# Patient Record
Sex: FEMALE | Race: WHITE | NOT HISPANIC OR LATINO | URBAN - METROPOLITAN AREA
[De-identification: names, ages, dates, MRNs, and addresses within clinical notes are randomized per-mention and may not be internally consistent; named-entity substitution may affect disease eponyms.]

---

## 2019-08-20 VITALS
RESPIRATION RATE: 18 BRPM | OXYGEN SATURATION: 98 % | HEART RATE: 90 BPM | SYSTOLIC BLOOD PRESSURE: 123 MMHG | TEMPERATURE: 98 F | WEIGHT: 193.35 LBS | DIASTOLIC BLOOD PRESSURE: 85 MMHG | HEIGHT: 67 IN

## 2019-08-20 NOTE — ASU PATIENT PROFILE, ADULT - PSH
H/O exploratory laparotomy    H/O total hysterectomy    History of open reduction and internal fixation (ORIF) procedure  left ankle  History of rhinoplasty    History of tonsillectomy

## 2019-08-20 NOTE — ASU PATIENT PROFILE, ADULT - PMH
Ankle fracture  left, 2016  Anxiety and depression    ETOH abuse    Fibromyalgia    H/O fall  6/14/19  Large bowel obstruction  2014, post op  Panic attacks    Rosacea    Seizures

## 2019-08-21 ENCOUNTER — OUTPATIENT (OUTPATIENT)
Dept: OUTPATIENT SERVICES | Facility: HOSPITAL | Age: 44
LOS: 1 days | Discharge: HOME | End: 2019-08-21
Payer: MEDICARE

## 2019-08-21 VITALS
HEART RATE: 86 BPM | RESPIRATION RATE: 32 BRPM | SYSTOLIC BLOOD PRESSURE: 109 MMHG | OXYGEN SATURATION: 98 % | DIASTOLIC BLOOD PRESSURE: 65 MMHG

## 2019-08-21 DIAGNOSIS — M25.311 OTHER INSTABILITY, RIGHT SHOULDER: ICD-10-CM

## 2019-08-21 DIAGNOSIS — Z90.89 ACQUIRED ABSENCE OF OTHER ORGANS: Chronic | ICD-10-CM

## 2019-08-21 DIAGNOSIS — R56.9 UNSPECIFIED CONVULSIONS: ICD-10-CM

## 2019-08-21 DIAGNOSIS — F41.9 ANXIETY DISORDER, UNSPECIFIED: ICD-10-CM

## 2019-08-21 DIAGNOSIS — Z90.710 ACQUIRED ABSENCE OF BOTH CERVIX AND UTERUS: Chronic | ICD-10-CM

## 2019-08-21 DIAGNOSIS — F10.10 ALCOHOL ABUSE, UNCOMPLICATED: ICD-10-CM

## 2019-08-21 DIAGNOSIS — Z98.890 OTHER SPECIFIED POSTPROCEDURAL STATES: Chronic | ICD-10-CM

## 2019-08-21 DIAGNOSIS — F41.0 PANIC DISORDER [EPISODIC PAROXYSMAL ANXIETY]: ICD-10-CM

## 2019-08-21 DIAGNOSIS — K56.609 UNSPECIFIED INTESTINAL OBSTRUCTION, UNSPECIFIED AS TO PARTIAL VERSUS COMPLETE OBSTRUCTION: ICD-10-CM

## 2019-08-21 DIAGNOSIS — L71.9 ROSACEA, UNSPECIFIED: ICD-10-CM

## 2019-08-21 RX ORDER — ONDANSETRON 8 MG/1
4 TABLET, FILM COATED ORAL EVERY 4 HOURS
Refills: 0 | Status: DISCONTINUED | OUTPATIENT
Start: 2019-08-21 | End: 2019-08-22

## 2019-08-21 RX ORDER — OXYCODONE AND ACETAMINOPHEN 5; 325 MG/1; MG/1
1 TABLET ORAL EVERY 4 HOURS
Refills: 0 | Status: DISCONTINUED | OUTPATIENT
Start: 2019-08-21 | End: 2019-08-22

## 2019-08-21 RX ORDER — KETOROLAC TROMETHAMINE 30 MG/ML
30 SYRINGE (ML) INJECTION ONCE
Refills: 0 | Status: DISCONTINUED | OUTPATIENT
Start: 2019-08-21 | End: 2019-08-22

## 2019-08-21 RX ORDER — MORPHINE SULFATE 50 MG/1
4 CAPSULE, EXTENDED RELEASE ORAL
Refills: 0 | Status: DISCONTINUED | OUTPATIENT
Start: 2019-08-21 | End: 2019-08-22

## 2019-08-21 NOTE — H&P ADULT - PROBLEM SELECTOR PLAN 1
Admit to Orthopedic Service   For elective right shoulder arthroscopy, possible ORIF   Medically cleared and optimized for surgery by

## 2019-08-21 NOTE — H&P ADULT - NSICDXPASTSURGICALHX_GEN_ALL_CORE_FT
PAST SURGICAL HISTORY:  H/O exploratory laparotomy     H/O total hysterectomy     History of open reduction and internal fixation (ORIF) procedure left ankle    History of rhinoplasty     History of tonsillectomy

## 2019-08-21 NOTE — H&P ADULT - NSICDXPASTMEDICALHX_GEN_ALL_CORE_FT
PAST MEDICAL HISTORY:  Ankle fracture left, 2016    Anxiety and depression     ETOH abuse     H/O fall 6/14/19    Large bowel obstruction 2014, post op    Panic attacks     Rosacea     Seizures

## 2019-08-21 NOTE — H&P ADULT - NSHPLABSRESULTS_GEN_ALL_CORE
Preop CBC, BMP, PT/INR, PTT within normal range  UA negative   Preop CXR within normal limits, reviewed per medical clearance   Preop EKG NSR and reviewed per medical clearance

## 2019-08-21 NOTE — H&P ADULT - NSHPPHYSICALEXAM_GEN_ALL_CORE
Gen: 44 y/o female, well nourished, well developed, NAD  MSK: Decreased ROM secondary to pain at the right shoulder   sensation intact to light touch bilateral upper extremities  radial pulses 2+,  brisk capillary refill   strength 5/5 bilaterally     Rest of PE per MD clearance

## 2019-08-21 NOTE — H&P ADULT - HISTORY OF PRESENT ILLNESS
44 y/o female who presents with c/o right shoulder pain present x  Patient elects to undergo right shoulder arthroscopy, possible ORIF with Dr. Aponte 42 y/o female who presents with c/o right shoulder pain present x since last year. Patient has had multiple dislocations which are siezure related. Patient states last dislocation was yesterday. Patient feels instablity of the shoulder and also pain. Patient takes oxycodone for pain at times. Patient denies any CP, SOB, fever, chills, numbness/tingling.   Patient elects to undergo right shoulder arthroscopy, possible ORIF with Dr. Aponte

## 2019-08-22 PROCEDURE — C1713: CPT

## 2019-08-22 PROCEDURE — 76000 FLUOROSCOPY <1 HR PHYS/QHP: CPT

## 2019-08-22 PROCEDURE — 23455 REPAIR SHOULDER CAPSULE: CPT | Mod: RT

## 2019-08-22 PROCEDURE — C1769: CPT

## 2020-04-14 NOTE — ASU PREOP CHECKLIST - CHLOROHEXIDINE WASH IN ASU
Called and spoke with patient's father. He states patient continues to report pain in the right elbow, she is wearing the sling as directed, and she is having troubles straightening the arm. Patient is right hand dominant and has not gone back to using arm for ADLs at this time.     Patient's father asked if an appointment would be recommended and I did advise, that will those symptoms and after reviewing Dr. Everett Chicas's note a visit would be recommend to reassess the elbow. I did recommend an in-person visit per Dr. Adame's recommendation from 4/9/2020. Father agreed he would be more comfortable with an in-person visit in-case images were needed. I stated provider would determine that at time of visit.     Patient's father wanted to discuss with his wife and will call back to the clinic. He was given direct number to schedule at the .     Juana Kunz, ATC         21-Aug-2019 10:00

## 2020-11-10 PROBLEM — K56.609 UNSPECIFIED INTESTINAL OBSTRUCTION, UNSPECIFIED AS TO PARTIAL VERSUS COMPLETE OBSTRUCTION: Chronic | Status: ACTIVE | Noted: 2019-08-20

## 2020-11-10 PROBLEM — F41.0 PANIC DISORDER [EPISODIC PAROXYSMAL ANXIETY]: Chronic | Status: ACTIVE | Noted: 2019-08-20

## 2020-11-10 PROBLEM — F41.9 ANXIETY DISORDER, UNSPECIFIED: Chronic | Status: ACTIVE | Noted: 2019-08-20

## 2020-11-10 PROBLEM — R56.9 UNSPECIFIED CONVULSIONS: Chronic | Status: ACTIVE | Noted: 2019-08-20

## 2020-11-10 PROBLEM — Z91.81 HISTORY OF FALLING: Chronic | Status: ACTIVE | Noted: 2019-08-20

## 2020-11-10 PROBLEM — L71.9 ROSACEA, UNSPECIFIED: Chronic | Status: ACTIVE | Noted: 2019-08-20

## 2020-11-10 PROBLEM — S82.899A OTHER FRACTURE OF UNSPECIFIED LOWER LEG, INITIAL ENCOUNTER FOR CLOSED FRACTURE: Chronic | Status: ACTIVE | Noted: 2019-08-20

## 2020-11-17 ENCOUNTER — TRANSCRIPTION ENCOUNTER (OUTPATIENT)
Age: 45
End: 2020-11-17

## 2020-11-17 RX ORDER — POVIDONE-IODINE 5 %
1 AEROSOL (ML) TOPICAL ONCE
Refills: 0 | Status: COMPLETED | OUTPATIENT
Start: 2020-11-18 | End: 2020-11-18

## 2020-11-17 NOTE — H&P ADULT - NSHPLABSRESULTS_GEN_ALL_CORE
preop cbc/bmp/coags/ua wnl per medical clearance   Preop EKG wnl per clearance  Preop chest x-ray wnl per clearance  Covid negative 11/16/20

## 2020-11-17 NOTE — H&P ADULT - HISTORY OF PRESENT ILLNESS
44yo f c/o right shoulder pain x   Presents today for elective RIGHT TSR. 45F c/o right shoulder pain x chronic. Pt has hx of seizures and has resultantly had several shoulder dislocations. In August 2019 she had a right shoulder ORIF with a subsequent removal of hardware in October 2020. During that procedure it was determined that she is an appropriate candidate for total shoulder replacement due to her persistent pain despite conservative management. She states her pain radiates from the right side of her neck into her scapula. She states it is difficult for her to raise her right arm 2/2 pain and weakness. She reports constant numbness/tingling of her 1st and 2nd digits of the RUE. She denies DVT Hx; denies CP, SOB, N/V, tactile fevers, calf pain. Of note, patients last seizure was in February 2020 with no known preceding event.     Presents today for elective right total shoulder arthroplasty

## 2020-11-17 NOTE — H&P ADULT - NSHPPHYSICALEXAM_GEN_ALL_CORE
GENERAL:  PE:  Decreased ROM secondary to pain. Rest of PE per medical clearance. MSK: + decreased ROM 2/2 pain, right shoulder  Skin warm and well perfused, no visible erythema   Sensation intact to bilateral UE distally. Motor Strength 5/5 to /interossei/triceps/biceps bilaterally. AIN/PIN intact.   Radial pulses 2+   Remainder of exam per medical clearance note

## 2020-11-17 NOTE — H&P ADULT - PROBLEM SELECTOR PLAN 1
Admit to Orthopaedic Service.  Presents today for elective RIGHT TSR.  Pt medically stable and cleared for procedure today by Dr. Butler.

## 2020-11-18 ENCOUNTER — INPATIENT (INPATIENT)
Facility: HOSPITAL | Age: 45
LOS: 0 days | Discharge: HOME | DRG: 507 | End: 2020-11-19
Payer: MEDICARE

## 2020-11-18 VITALS
RESPIRATION RATE: 18 BRPM | HEIGHT: 67 IN | HEART RATE: 78 BPM | TEMPERATURE: 98 F | WEIGHT: 162.26 LBS | DIASTOLIC BLOOD PRESSURE: 73 MMHG | SYSTOLIC BLOOD PRESSURE: 114 MMHG | OXYGEN SATURATION: 100 %

## 2020-11-18 DIAGNOSIS — Z90.89 ACQUIRED ABSENCE OF OTHER ORGANS: Chronic | ICD-10-CM

## 2020-11-18 DIAGNOSIS — Z98.890 OTHER SPECIFIED POSTPROCEDURAL STATES: Chronic | ICD-10-CM

## 2020-11-18 DIAGNOSIS — R56.9 UNSPECIFIED CONVULSIONS: ICD-10-CM

## 2020-11-18 DIAGNOSIS — Z41.9 ENCOUNTER FOR PROCEDURE FOR PURPOSES OTHER THAN REMEDYING HEALTH STATE, UNSPECIFIED: Chronic | ICD-10-CM

## 2020-11-18 DIAGNOSIS — Z90.710 ACQUIRED ABSENCE OF BOTH CERVIX AND UTERUS: Chronic | ICD-10-CM

## 2020-11-18 DIAGNOSIS — F41.9 ANXIETY DISORDER, UNSPECIFIED: ICD-10-CM

## 2020-11-18 DIAGNOSIS — M19.90 UNSPECIFIED OSTEOARTHRITIS, UNSPECIFIED SITE: ICD-10-CM

## 2020-11-18 DIAGNOSIS — F10.10 ALCOHOL ABUSE, UNCOMPLICATED: ICD-10-CM

## 2020-11-18 PROCEDURE — 73030 X-RAY EXAM OF SHOULDER: CPT | Mod: 26,RT

## 2020-11-18 RX ORDER — ESLICARBAZEPINE ACETATE 800 MG/1
800 TABLET ORAL DAILY
Refills: 0 | Status: DISCONTINUED | OUTPATIENT
Start: 2020-11-18 | End: 2020-11-19

## 2020-11-18 RX ORDER — ALPRAZOLAM 0.25 MG
2 TABLET ORAL THREE TIMES A DAY
Refills: 0 | Status: DISCONTINUED | OUTPATIENT
Start: 2020-11-18 | End: 2020-11-19

## 2020-11-18 RX ORDER — SODIUM CHLORIDE 9 MG/ML
1000 INJECTION, SOLUTION INTRAVENOUS
Refills: 0 | Status: DISCONTINUED | OUTPATIENT
Start: 2020-11-18 | End: 2020-11-19

## 2020-11-18 RX ORDER — ONDANSETRON 8 MG/1
4 TABLET, FILM COATED ORAL EVERY 6 HOURS
Refills: 0 | Status: DISCONTINUED | OUTPATIENT
Start: 2020-11-18 | End: 2020-11-19

## 2020-11-18 RX ORDER — MORPHINE SULFATE 50 MG/1
4 CAPSULE, EXTENDED RELEASE ORAL
Refills: 0 | Status: DISCONTINUED | OUTPATIENT
Start: 2020-11-18 | End: 2020-11-19

## 2020-11-18 RX ORDER — LACOSAMIDE 50 MG/1
200 TABLET ORAL
Refills: 0 | Status: DISCONTINUED | OUTPATIENT
Start: 2020-11-18 | End: 2020-11-19

## 2020-11-18 RX ORDER — MAGNESIUM HYDROXIDE 400 MG/1
30 TABLET, CHEWABLE ORAL DAILY
Refills: 0 | Status: DISCONTINUED | OUTPATIENT
Start: 2020-11-18 | End: 2020-11-19

## 2020-11-18 RX ORDER — CHLORHEXIDINE GLUCONATE 213 G/1000ML
1 SOLUTION TOPICAL ONCE
Refills: 0 | Status: COMPLETED | OUTPATIENT
Start: 2020-11-18 | End: 2020-11-18

## 2020-11-18 RX ORDER — ACETAMINOPHEN 500 MG
650 TABLET ORAL EVERY 6 HOURS
Refills: 0 | Status: DISCONTINUED | OUTPATIENT
Start: 2020-11-18 | End: 2020-11-19

## 2020-11-18 RX ORDER — HYDROMORPHONE HYDROCHLORIDE 2 MG/ML
0.5 INJECTION INTRAMUSCULAR; INTRAVENOUS; SUBCUTANEOUS EVERY 4 HOURS
Refills: 0 | Status: DISCONTINUED | OUTPATIENT
Start: 2020-11-18 | End: 2020-11-19

## 2020-11-18 RX ORDER — POLYETHYLENE GLYCOL 3350 17 G/17G
17 POWDER, FOR SOLUTION ORAL DAILY
Refills: 0 | Status: DISCONTINUED | OUTPATIENT
Start: 2020-11-18 | End: 2020-11-19

## 2020-11-18 RX ORDER — OXYCODONE HYDROCHLORIDE 5 MG/1
10 TABLET ORAL EVERY 4 HOURS
Refills: 0 | Status: DISCONTINUED | OUTPATIENT
Start: 2020-11-18 | End: 2020-11-19

## 2020-11-18 RX ORDER — SENNA PLUS 8.6 MG/1
2 TABLET ORAL AT BEDTIME
Refills: 0 | Status: DISCONTINUED | OUTPATIENT
Start: 2020-11-18 | End: 2020-11-19

## 2020-11-18 RX ORDER — CEFAZOLIN SODIUM 1 G
2000 VIAL (EA) INJECTION EVERY 8 HOURS
Refills: 0 | Status: COMPLETED | OUTPATIENT
Start: 2020-11-18 | End: 2020-11-19

## 2020-11-18 RX ORDER — OXYCODONE HYDROCHLORIDE 5 MG/1
5 TABLET ORAL EVERY 4 HOURS
Refills: 0 | Status: DISCONTINUED | OUTPATIENT
Start: 2020-11-18 | End: 2020-11-19

## 2020-11-18 RX ADMIN — OXYCODONE HYDROCHLORIDE 10 MILLIGRAM(S): 5 TABLET ORAL at 13:55

## 2020-11-18 RX ADMIN — Medication 100 MILLIGRAM(S): at 16:31

## 2020-11-18 RX ADMIN — HYDROMORPHONE HYDROCHLORIDE 0.5 MILLIGRAM(S): 2 INJECTION INTRAMUSCULAR; INTRAVENOUS; SUBCUTANEOUS at 20:38

## 2020-11-18 RX ADMIN — Medication 1 APPLICATION(S): at 06:37

## 2020-11-18 RX ADMIN — Medication 2 MILLIGRAM(S): at 14:06

## 2020-11-18 RX ADMIN — OXYCODONE HYDROCHLORIDE 10 MILLIGRAM(S): 5 TABLET ORAL at 12:55

## 2020-11-18 RX ADMIN — LACOSAMIDE 200 MILLIGRAM(S): 50 TABLET ORAL at 22:01

## 2020-11-18 RX ADMIN — ESLICARBAZEPINE ACETATE 800 MILLIGRAM(S): 800 TABLET ORAL at 22:02

## 2020-11-18 RX ADMIN — HYDROMORPHONE HYDROCHLORIDE 0.5 MILLIGRAM(S): 2 INJECTION INTRAMUSCULAR; INTRAVENOUS; SUBCUTANEOUS at 21:00

## 2020-11-18 RX ADMIN — Medication 650 MILLIGRAM(S): at 17:08

## 2020-11-18 RX ADMIN — CHLORHEXIDINE GLUCONATE 1 APPLICATION(S): 213 SOLUTION TOPICAL at 05:37

## 2020-11-18 RX ADMIN — Medication 650 MILLIGRAM(S): at 18:08

## 2020-11-18 RX ADMIN — OXYCODONE HYDROCHLORIDE 10 MILLIGRAM(S): 5 TABLET ORAL at 18:08

## 2020-11-18 RX ADMIN — Medication 2 MILLIGRAM(S): at 22:48

## 2020-11-18 RX ADMIN — OXYCODONE HYDROCHLORIDE 10 MILLIGRAM(S): 5 TABLET ORAL at 17:08

## 2020-11-18 NOTE — PROGRESS NOTE ADULT - SUBJECTIVE AND OBJECTIVE BOX
Orthopedics Post Op Check    Procedure: RIGHT TSR  Surgeon: YUMIKO Sandy. stable, c/o pain in right shoulder along with decreased sensation.   Denies CP, SOB, N/V.     Vital Signs Last 24 Hrs  T(C): 36.3 (18 Nov 2020 11:42), Max: 36.8 (18 Nov 2020 05:45)  T(F): 97.4 (18 Nov 2020 11:42), Max: 98.2 (18 Nov 2020 05:45)  HR: 64 (18 Nov 2020 11:42) (64 - 78)  BP: 106/67 (18 Nov 2020 11:42) (99/66 - 114/73)  BP(mean): 82 (18 Nov 2020 11:42) (77 - 84)  RR: 14 (18 Nov 2020 11:42) (13 - 23)  SpO2: 100% (18 Nov 2020 11:42) (100% - 100%)    General: laying in bed, c/o mild pain  Right UE: Dressing C/D/I in sling  Motor: slt not intact   brachial/radial pulses 2+, biceps/triceps/delt- 0/5 not tested 2/2 to sx, , finger int 4/5 (2/2 to block)      Post op XR: prosthesis in place    A/P: 45yoFemale POD#0 s/p RIGHT TSR  - Stable  - Pain Control  - DVT ppx: SCDS  - Post op abx: ANCEF  - PT, WBS: NWB RIGHT UE WITH SLING  - F/U AM Labs and motor 2/2 to block

## 2020-11-19 ENCOUNTER — TRANSCRIPTION ENCOUNTER (OUTPATIENT)
Age: 45
End: 2020-11-19

## 2020-11-19 VITALS
OXYGEN SATURATION: 96 % | HEART RATE: 104 BPM | TEMPERATURE: 98 F | DIASTOLIC BLOOD PRESSURE: 66 MMHG | RESPIRATION RATE: 14 BRPM | SYSTOLIC BLOOD PRESSURE: 98 MMHG

## 2020-11-19 DIAGNOSIS — E87.1 HYPO-OSMOLALITY AND HYPONATREMIA: ICD-10-CM

## 2020-11-19 DIAGNOSIS — D62 ACUTE POSTHEMORRHAGIC ANEMIA: ICD-10-CM

## 2020-11-19 DIAGNOSIS — Z98.890 OTHER SPECIFIED POSTPROCEDURAL STATES: ICD-10-CM

## 2020-11-19 LAB
ANION GAP SERPL CALC-SCNC: 11 MMOL/L — SIGNIFICANT CHANGE UP (ref 5–17)
BUN SERPL-MCNC: 19 MG/DL — SIGNIFICANT CHANGE UP (ref 7–23)
CALCIUM SERPL-MCNC: 8.4 MG/DL — SIGNIFICANT CHANGE UP (ref 8.4–10.5)
CHLORIDE SERPL-SCNC: 98 MMOL/L — SIGNIFICANT CHANGE UP (ref 96–108)
CO2 SERPL-SCNC: 22 MMOL/L — SIGNIFICANT CHANGE UP (ref 22–31)
CREAT SERPL-MCNC: 0.9 MG/DL — SIGNIFICANT CHANGE UP (ref 0.5–1.3)
GLUCOSE SERPL-MCNC: 113 MG/DL — HIGH (ref 70–99)
HCT VFR BLD CALC: 27.7 % — LOW (ref 34.5–45)
HGB BLD-MCNC: 8.7 G/DL — LOW (ref 11.5–15.5)
MCHC RBC-ENTMCNC: 27.5 PG — SIGNIFICANT CHANGE UP (ref 27–34)
MCHC RBC-ENTMCNC: 31.4 GM/DL — LOW (ref 32–36)
MCV RBC AUTO: 87.7 FL — SIGNIFICANT CHANGE UP (ref 80–100)
NRBC # BLD: 0 /100 WBCS — SIGNIFICANT CHANGE UP (ref 0–0)
PLATELET # BLD AUTO: 218 K/UL — SIGNIFICANT CHANGE UP (ref 150–400)
POTASSIUM SERPL-MCNC: 4 MMOL/L — SIGNIFICANT CHANGE UP (ref 3.5–5.3)
POTASSIUM SERPL-SCNC: 4 MMOL/L — SIGNIFICANT CHANGE UP (ref 3.5–5.3)
RBC # BLD: 3.16 M/UL — LOW (ref 3.8–5.2)
RBC # FLD: 15.9 % — HIGH (ref 10.3–14.5)
SODIUM SERPL-SCNC: 131 MMOL/L — LOW (ref 135–145)
WBC # BLD: 5.36 K/UL — SIGNIFICANT CHANGE UP (ref 3.8–10.5)
WBC # FLD AUTO: 5.36 K/UL — SIGNIFICANT CHANGE UP (ref 3.8–10.5)

## 2020-11-19 PROCEDURE — 80048 BASIC METABOLIC PNL TOTAL CA: CPT

## 2020-11-19 PROCEDURE — 36415 COLL VENOUS BLD VENIPUNCTURE: CPT

## 2020-11-19 PROCEDURE — C1776: CPT

## 2020-11-19 PROCEDURE — C1713: CPT

## 2020-11-19 PROCEDURE — C1889: CPT

## 2020-11-19 PROCEDURE — 99232 SBSQ HOSP IP/OBS MODERATE 35: CPT | Mod: GC

## 2020-11-19 PROCEDURE — 73030 X-RAY EXAM OF SHOULDER: CPT

## 2020-11-19 PROCEDURE — 97161 PT EVAL LOW COMPLEX 20 MIN: CPT

## 2020-11-19 PROCEDURE — 85027 COMPLETE CBC AUTOMATED: CPT

## 2020-11-19 RX ORDER — CYCLOBENZAPRINE HYDROCHLORIDE 10 MG/1
5 TABLET, FILM COATED ORAL ONCE
Refills: 0 | Status: COMPLETED | OUTPATIENT
Start: 2020-11-19 | End: 2020-11-19

## 2020-11-19 RX ORDER — ACETAMINOPHEN 500 MG
2 TABLET ORAL
Qty: 0 | Refills: 0 | DISCHARGE
Start: 2020-11-19

## 2020-11-19 RX ORDER — SENNA PLUS 8.6 MG/1
2 TABLET ORAL
Qty: 0 | Refills: 0 | DISCHARGE
Start: 2020-11-19

## 2020-11-19 RX ORDER — CYCLOBENZAPRINE HYDROCHLORIDE 10 MG/1
1 TABLET, FILM COATED ORAL
Qty: 21 | Refills: 0
Start: 2020-11-19 | End: 2020-11-25

## 2020-11-19 RX ORDER — POLYETHYLENE GLYCOL 3350 17 G/17G
17 POWDER, FOR SOLUTION ORAL
Qty: 0 | Refills: 0 | DISCHARGE
Start: 2020-11-19

## 2020-11-19 RX ORDER — OXYCODONE HYDROCHLORIDE 5 MG/1
1 TABLET ORAL
Qty: 40 | Refills: 0
Start: 2020-11-19 | End: 2020-11-25

## 2020-11-19 RX ORDER — CYCLOBENZAPRINE HYDROCHLORIDE 10 MG/1
5 TABLET, FILM COATED ORAL THREE TIMES A DAY
Refills: 0 | Status: DISCONTINUED | OUTPATIENT
Start: 2020-11-19 | End: 2020-11-19

## 2020-11-19 RX ADMIN — LACOSAMIDE 200 MILLIGRAM(S): 50 TABLET ORAL at 07:07

## 2020-11-19 RX ADMIN — HYDROMORPHONE HYDROCHLORIDE 0.5 MILLIGRAM(S): 2 INJECTION INTRAMUSCULAR; INTRAVENOUS; SUBCUTANEOUS at 02:03

## 2020-11-19 RX ADMIN — HYDROMORPHONE HYDROCHLORIDE 0.5 MILLIGRAM(S): 2 INJECTION INTRAMUSCULAR; INTRAVENOUS; SUBCUTANEOUS at 06:39

## 2020-11-19 RX ADMIN — OXYCODONE HYDROCHLORIDE 10 MILLIGRAM(S): 5 TABLET ORAL at 13:11

## 2020-11-19 RX ADMIN — OXYCODONE HYDROCHLORIDE 10 MILLIGRAM(S): 5 TABLET ORAL at 03:57

## 2020-11-19 RX ADMIN — OXYCODONE HYDROCHLORIDE 10 MILLIGRAM(S): 5 TABLET ORAL at 01:00

## 2020-11-19 RX ADMIN — Medication 650 MILLIGRAM(S): at 06:09

## 2020-11-19 RX ADMIN — Medication 650 MILLIGRAM(S): at 01:00

## 2020-11-19 RX ADMIN — HYDROMORPHONE HYDROCHLORIDE 0.5 MILLIGRAM(S): 2 INJECTION INTRAMUSCULAR; INTRAVENOUS; SUBCUTANEOUS at 02:30

## 2020-11-19 RX ADMIN — Medication 100 MILLIGRAM(S): at 00:06

## 2020-11-19 RX ADMIN — Medication 2 MILLIGRAM(S): at 07:07

## 2020-11-19 RX ADMIN — HYDROMORPHONE HYDROCHLORIDE 0.5 MILLIGRAM(S): 2 INJECTION INTRAMUSCULAR; INTRAVENOUS; SUBCUTANEOUS at 06:09

## 2020-11-19 RX ADMIN — OXYCODONE HYDROCHLORIDE 10 MILLIGRAM(S): 5 TABLET ORAL at 04:56

## 2020-11-19 RX ADMIN — OXYCODONE HYDROCHLORIDE 10 MILLIGRAM(S): 5 TABLET ORAL at 12:41

## 2020-11-19 RX ADMIN — Medication 650 MILLIGRAM(S): at 00:06

## 2020-11-19 RX ADMIN — CYCLOBENZAPRINE HYDROCHLORIDE 5 MILLIGRAM(S): 10 TABLET, FILM COATED ORAL at 10:09

## 2020-11-19 RX ADMIN — OXYCODONE HYDROCHLORIDE 10 MILLIGRAM(S): 5 TABLET ORAL at 08:30

## 2020-11-19 RX ADMIN — Medication 650 MILLIGRAM(S): at 07:00

## 2020-11-19 RX ADMIN — OXYCODONE HYDROCHLORIDE 10 MILLIGRAM(S): 5 TABLET ORAL at 09:30

## 2020-11-19 RX ADMIN — OXYCODONE HYDROCHLORIDE 10 MILLIGRAM(S): 5 TABLET ORAL at 00:06

## 2020-11-19 NOTE — OCCUPATIONAL THERAPY INITIAL EVALUATION ADULT - PERTINENT HX OF CURRENT PROBLEM, REHAB EVAL
45F c/o right shoulder pain x chronic. Pt has hx of seizures and has resultantly had several shoulder dislocations. In August 2019 she had a right shoulder ORIF with a subsequent removal of hardware in October 2020. During that procedure it was determined that she is an appropriate candidate for total shoulder replacement due to her persistent pain despite conservative management.

## 2020-11-19 NOTE — PROGRESS NOTE ADULT - SUBJECTIVE AND OBJECTIVE BOX
Interval Events: Reviewed  Patient seen and examined at bedside.    Patient is a 45y old  Female who presents with a chief complaint of right shoulder pain (19 Nov 2020 07:44)  she is doing well and she did PT    PAST MEDICAL & SURGICAL HISTORY:  ETOH abuse  Pt denies    Anxiety and depression    Large bowel obstruction  2014, post op    H/O fall  6/14/19    Ankle fracture  left, 2016    Seizures    Panic attacks    Rosacea    Elective surgery  screws removed from right shoulder oct 2020    Elective surgery  laparoscopy adhesions removed- sept 2020    History of open reduction and internal fixation (ORIF) procedure  left ankle    H/O exploratory laparotomy    History of rhinoplasty    History of tonsillectomy    H/O total hysterectomy        MEDICATIONS:  Pulmonary:    Antimicrobials:    Anticoagulants:    Cardiac:      Allergies    No Known Allergies    Intolerances        Vital Signs Last 24 Hrs  T(C): 36.7 (19 Nov 2020 09:00), Max: 36.8 (19 Nov 2020 04:55)  T(F): 98 (19 Nov 2020 09:00), Max: 98.3 (19 Nov 2020 04:55)  HR: 104 (19 Nov 2020 09:00) (99 - 104)  BP: 98/66 (19 Nov 2020 09:00) (97/60 - 98/68)  BP(mean): --  RR: 14 (19 Nov 2020 09:00) (14 - 17)  SpO2: 96% (19 Nov 2020 09:00) (96% - 97%)    11-18 @ 07:01  -  11-19 @ 07:00  --------------------------------------------------------  IN: 240 mL / OUT: 360 mL / NET: -120 mL          Review of Systems:   •	General: negative  •	Skin/Breast: negative  •	Ophthalmologic: negative  •	ENMT: negative  •	Respiratory and Thorax: negative  •	Cardiovascular: negative  •	Gastrointestinal: negative  •	Genitourinary: negative  •	Musculoskeletal: negative  •	Neurological: negative  •	Psychiatric: negative  •	Hematology/Lymphatics: negative  •	Endocrine: negative  •	Allergic/Immunologic: negative    Physical Exam:   • Constitutional:	Well-developed, well nourished  • Eyes:	EOMI; PERRL; no drainage or redness  • ENMT:	No oral lesions; no gross abnormalities  • Neck	No bruits; no thyromegaly or nodules  • Breasts:	not examined  • Back:	No deformity or limitation of movement  • Respiratory:	Breath Sounds equal & clear to percussion & auscultation, no accessory muscle use  • Cardiovascular:	Regular rate & rhythm, normal S1, S2; no murmurs, gallops or rubs; no S3, S4  • Gastrointestinal:	Soft, non-tender, no hepatosplenomegaly, normal bowel sounds  • Genitourinary:	not examined  • Rectal: not examined  • Extremities:	No cyanosis, clubbing or edema  • Vascular:	Equal and normal pulses (carotid, femoral, dorsalis pedis)  • Neurologica:l	not examined  • Skin:	No lesions; no rash  • Lymph Nodes:	No lymphadedenopathy  • Musculoskeletal:	No joint pain, swelling or deformity; no limitation of movement        LABS:      CBC Full  -  ( 19 Nov 2020 07:04 )  WBC Count : 5.36 K/uL  RBC Count : 3.16 M/uL  Hemoglobin : 8.7 g/dL  Hematocrit : 27.7 %  Platelet Count - Automated : 218 K/uL  Mean Cell Volume : 87.7 fl  Mean Cell Hemoglobin : 27.5 pg  Mean Cell Hemoglobin Concentration : 31.4 gm/dL  Auto Neutrophil # : x  Auto Lymphocyte # : x  Auto Monocyte # : x  Auto Eosinophil # : x  Auto Basophil # : x  Auto Neutrophil % : x  Auto Lymphocyte % : x  Auto Monocyte % : x  Auto Eosinophil % : x  Auto Basophil % : x    11-19    131<L>  |  98  |  19  ----------------------------<  113<H>  4.0   |  22  |  0.90    Ca    8.4      19 Nov 2020 07:04                          RADIOLOGY & ADDITIONAL STUDIES (The following images were personally reviewed):  [place]:                                     No  Urine output:                       adequate  DVT prophylaxis:                 Yes  Flattus:                                  Yes  Bowel movement:              No

## 2020-11-19 NOTE — DISCHARGE NOTE PROVIDER - CARE PROVIDER_API CALL
Kings Aponte)  Orthopaedic Surgery  [address] [place] [address]  Phone: [phone]  Fax: [phone]  Follow Up Time: 2 weeks

## 2020-11-19 NOTE — PROGRESS NOTE ADULT - PROBLEM SELECTOR PLAN 2
Monitor hemoglobin. Hold transfusion unless hemoglobin is 7, 8 in patient with coronary artery disease, hemodynamic instability such as tachycardia/hypotension, or there is evidence of acute blood loss.  Anemia is due to postoperative blood loss

## 2020-11-19 NOTE — DISCHARGE NOTE NURSING/CASE MANAGEMENT/SOCIAL WORK - PATIENT PORTAL LINK FT
You can access the FollowMyHealth Patient Portal offered by [facility] by registering at the following website: [email or web address]. By joining [facility]’s FollowMyHealth portal, you will also be able to view your health information using other applications (apps) compatible with our system.

## 2020-11-19 NOTE — PROGRESS NOTE ADULT - SUBJECTIVE AND OBJECTIVE BOX
Orthopedics     Procedure: Right TSA 11/18/20   Surgeon: Fadi    Pain moderately controlled overnight. Sensation/motor returned.   Denies CP, SOB, N/V.     Vital Signs Last 24 Hrs  T(C): 36.3 (18 Nov 2020 11:42), Max: 36.8 (18 Nov 2020 05:45)  T(F): 97.4 (18 Nov 2020 11:42), Max: 98.2 (18 Nov 2020 05:45)  HR: 64 (18 Nov 2020 11:42) (64 - 78)  BP: 106/67 (18 Nov 2020 11:42) (99/66 - 114/73)  BP(mean): 82 (18 Nov 2020 11:42) (77 - 84)  RR: 14 (18 Nov 2020 11:42) (13 - 23)  SpO2: 100% (18 Nov 2020 11:42) (100% - 100%)    General: laying in bed, comfortable   Right UE: Dressing C/D/I in sling  Sensation: SILT rad/med/uln  Motor: Firing EPL/FPL/IO  2+ rad     A/P: 45yoFemale s/p Right TSA  - Stable  - Pain Control  - DVT ppx: SCDs  - Post op abx: Ancef  - PT, WBS: NWB RUE in sling   - F/U AM Labs

## 2020-11-19 NOTE — OCCUPATIONAL THERAPY INITIAL EVALUATION ADULT - GROSSLY INTACT, SENSORY
Pt reports decreased sensation to R hand 2/2 from nerve block/Left UE/Right LE/Left LE/Grossly Intact

## 2020-11-19 NOTE — DISCHARGE NOTE PROVIDER - NSDCCPCAREPLAN_GEN_ALL_CORE_FT
PRINCIPAL DISCHARGE DIAGNOSIS  Diagnosis: Osteoarthritis  Assessment and Plan of Treatment: Osteoarthritis

## 2020-11-19 NOTE — DISCHARGE NOTE PROVIDER - HOSPITAL COURSE
Admitted 11/18/20  Surgery right TSA  [facility]-op Antibiotics  Pain control  DVT prophylaxis  OOB/Physical Therapy

## 2020-11-19 NOTE — DISCHARGE NOTE PROVIDER - NSDCFUADDINST_GEN_ALL_CORE_FT
No strenuous activity, heavy lifting, driving or returning to work until cleared by MD.  You may shower - dressing is water-resistant, no soaking in bathtubs.  Dressing to be removed after post op day 5-7, then can leave incision open to air. Keep incision clean and dry.  Any staples/sutures should be removed in 10-14 days.  Try to have regular bowel movements, take stool softener or laxative if necessary.  May take Aleve or Naproxen instead of Celebrex.  Swelling may travel all the way down leg to foot, this is normal and will subside in a few weeks.  Call to schedule an appt with Dr. Aponte for follow up after discharge, usually 2-3 weeks postop.  Contact doctor if you experience: fever greater than 101.5, chills, chest pain, difficulty breathing, redness or excessive drainage around the incision, other concerns.  Follow up with primary care provider.   No weight bearing on RUE, wear sling.  No strenuous activity, heavy lifting, driving or returning to work until cleared by MD.  You may shower - dressing is water-resistant, no soaking in bathtubs.  Dressing to be removed after post op day 5-7, then can leave incision open to air. Keep incision clean and dry.  Any staples/sutures should be removed in 10-14 days.  Try to have regular bowel movements, take stool softener or laxative if necessary.  May take Aleve or Naproxen instead of Celebrex.  Swelling may travel all the way down leg to foot, this is normal and will subside in a few weeks.  Call to schedule an appt with Dr. Aponte for follow up after discharge, usually 2-3 weeks postop.  Contact doctor if you experience: fever greater than 101.5, chills, chest pain, difficulty breathing, redness or excessive drainage around the incision, other concerns.  Follow up with primary care provider.

## 2020-11-19 NOTE — OCCUPATIONAL THERAPY INITIAL EVALUATION ADULT - PERSONAL SAFETY AND JUDGMENT, REHAB EVAL
Instructions:  - check old bloodwork for WBC count and let me know if it has ever been low  - CBC today (labs)  - metamucil 1 spoon twice a day- send me an email in about 1-2 weeks for update  - other options include immodium, questran or cholestyramine, prescription lomotil  - we can try antibiotics though I am hesitant- rifaximin  - IBS diarrhea- viberzi  - email me the draft of accommodations letter for [employer]   intact

## 2020-11-19 NOTE — DISCHARGE NOTE PROVIDER - NSDCMRMEDTOKEN_GEN_ALL_CORE_FT
Aptiom 800 mg oral tablet: orally once a day (at bedtime)  doxycycline 20 mg oral tablet: 1 tab(s) orally every 12 hours  Vimpat 200 mg oral tablet:   Xanax 2 mg oral tablet: 1 tab(s) orally 3 times a day   acetaminophen 325 mg oral tablet: 2 tab(s) orally every 6 hours for 2 weeks  DO not exceed 4000mg/day  Aptiom 800 mg oral tablet: orally once a day (at bedtime)  cyclobenzaprine 5 mg oral tablet: 1 tab(s) orally 3 times a day, As needed, Muscle Spasm  oxyCODONE 5 mg oral tablet: 1 to 2 tab(s) orally every 4 hours, As needed, Moderate to severe Pain (4 - 6) MDD:6  polyethylene glycol 3350 oral powder for reconstitution: 17 gram(s) orally once a day  senna oral tablet: 2 tab(s) orally once a day (at bedtime), As needed, Constipation  Vimpat 200 mg oral tablet:   Xanax 2 mg oral tablet: 1 tab(s) orally 3 times a day

## 2020-11-23 DIAGNOSIS — M19.011 PRIMARY OSTEOARTHRITIS, RIGHT SHOULDER: ICD-10-CM

## 2020-11-23 DIAGNOSIS — Z90.89 ACQUIRED ABSENCE OF OTHER ORGANS: ICD-10-CM

## 2020-11-23 DIAGNOSIS — R56.9 UNSPECIFIED CONVULSIONS: ICD-10-CM

## 2020-11-23 DIAGNOSIS — F41.8 OTHER SPECIFIED ANXIETY DISORDERS: ICD-10-CM

## 2020-11-23 DIAGNOSIS — E87.1 HYPO-OSMOLALITY AND HYPONATREMIA: ICD-10-CM

## 2020-11-23 DIAGNOSIS — D62 ACUTE POSTHEMORRHAGIC ANEMIA: ICD-10-CM

## 2020-11-23 DIAGNOSIS — Z86.59 PERSONAL HISTORY OF OTHER MENTAL AND BEHAVIORAL DISORDERS: ICD-10-CM

## 2020-11-23 DIAGNOSIS — Z90.710 ACQUIRED ABSENCE OF BOTH CERVIX AND UTERUS: ICD-10-CM

## 2020-12-12 PROBLEM — F10.10 ALCOHOL ABUSE, UNCOMPLICATED: Chronic | Status: ACTIVE | Noted: 2019-08-20

## 2020-12-15 ENCOUNTER — TRANSCRIPTION ENCOUNTER (OUTPATIENT)
Age: 45
End: 2020-12-15

## 2020-12-15 VITALS
HEIGHT: 67 IN | WEIGHT: 155.21 LBS | SYSTOLIC BLOOD PRESSURE: 112 MMHG | RESPIRATION RATE: 16 BRPM | HEART RATE: 81 BPM | OXYGEN SATURATION: 96 % | TEMPERATURE: 97 F | DIASTOLIC BLOOD PRESSURE: 73 MMHG

## 2020-12-15 RX ORDER — LACOSAMIDE 50 MG/1
0 TABLET ORAL
Qty: 0 | Refills: 0 | DISCHARGE

## 2020-12-16 ENCOUNTER — INPATIENT (INPATIENT)
Facility: HOSPITAL | Age: 45
LOS: 2 days | Discharge: HOME | DRG: 483 | End: 2020-12-19
Payer: MEDICARE

## 2020-12-16 DIAGNOSIS — Z96.611 PRESENCE OF RIGHT ARTIFICIAL SHOULDER JOINT: Chronic | ICD-10-CM

## 2020-12-16 DIAGNOSIS — F10.10 ALCOHOL ABUSE, UNCOMPLICATED: ICD-10-CM

## 2020-12-16 DIAGNOSIS — R56.9 UNSPECIFIED CONVULSIONS: ICD-10-CM

## 2020-12-16 DIAGNOSIS — Z98.890 OTHER SPECIFIED POSTPROCEDURAL STATES: Chronic | ICD-10-CM

## 2020-12-16 DIAGNOSIS — Z41.9 ENCOUNTER FOR PROCEDURE FOR PURPOSES OTHER THAN REMEDYING HEALTH STATE, UNSPECIFIED: Chronic | ICD-10-CM

## 2020-12-16 DIAGNOSIS — F41.0 PANIC DISORDER [EPISODIC PAROXYSMAL ANXIETY]: ICD-10-CM

## 2020-12-16 DIAGNOSIS — F41.9 ANXIETY DISORDER, UNSPECIFIED: ICD-10-CM

## 2020-12-16 DIAGNOSIS — Z90.710 ACQUIRED ABSENCE OF BOTH CERVIX AND UTERUS: Chronic | ICD-10-CM

## 2020-12-16 DIAGNOSIS — M25.511 PAIN IN RIGHT SHOULDER: ICD-10-CM

## 2020-12-16 DIAGNOSIS — L71.9 ROSACEA, UNSPECIFIED: ICD-10-CM

## 2020-12-16 DIAGNOSIS — Z90.89 ACQUIRED ABSENCE OF OTHER ORGANS: Chronic | ICD-10-CM

## 2020-12-16 PROCEDURE — 73020 X-RAY EXAM OF SHOULDER: CPT | Mod: 26,RT

## 2020-12-16 RX ORDER — HYDROMORPHONE HYDROCHLORIDE 2 MG/ML
0.5 INJECTION INTRAMUSCULAR; INTRAVENOUS; SUBCUTANEOUS EVERY 4 HOURS
Refills: 0 | Status: DISCONTINUED | OUTPATIENT
Start: 2020-12-16 | End: 2020-12-17

## 2020-12-16 RX ORDER — OXYCODONE HYDROCHLORIDE 5 MG/1
10 TABLET ORAL EVERY 4 HOURS
Refills: 0 | Status: DISCONTINUED | OUTPATIENT
Start: 2020-12-16 | End: 2020-12-17

## 2020-12-16 RX ORDER — LACOSAMIDE 50 MG/1
200 TABLET ORAL
Refills: 0 | Status: DISCONTINUED | OUTPATIENT
Start: 2020-12-16 | End: 2020-12-16

## 2020-12-16 RX ORDER — OXYCODONE HYDROCHLORIDE 5 MG/1
5 TABLET ORAL EVERY 4 HOURS
Refills: 0 | Status: DISCONTINUED | OUTPATIENT
Start: 2020-12-16 | End: 2020-12-17

## 2020-12-16 RX ORDER — LACOSAMIDE 50 MG/1
200 TABLET ORAL ONCE
Refills: 0 | Status: DISCONTINUED | OUTPATIENT
Start: 2020-12-16 | End: 2020-12-16

## 2020-12-16 RX ORDER — LACOSAMIDE 50 MG/1
200 TABLET ORAL
Refills: 0 | Status: DISCONTINUED | OUTPATIENT
Start: 2020-12-17 | End: 2020-12-16

## 2020-12-16 RX ORDER — OXYCODONE AND ACETAMINOPHEN 5; 325 MG/1; MG/1
1 TABLET ORAL EVERY 4 HOURS
Refills: 0 | Status: DISCONTINUED | OUTPATIENT
Start: 2020-12-16 | End: 2020-12-16

## 2020-12-16 RX ORDER — PANTOPRAZOLE SODIUM 20 MG/1
40 TABLET, DELAYED RELEASE ORAL
Refills: 0 | Status: DISCONTINUED | OUTPATIENT
Start: 2020-12-16 | End: 2020-12-19

## 2020-12-16 RX ORDER — HYDROMORPHONE HYDROCHLORIDE 2 MG/ML
0.5 INJECTION INTRAMUSCULAR; INTRAVENOUS; SUBCUTANEOUS
Refills: 0 | Status: DISCONTINUED | OUTPATIENT
Start: 2020-12-16 | End: 2020-12-16

## 2020-12-16 RX ORDER — HYDROMORPHONE HYDROCHLORIDE 2 MG/ML
1 INJECTION INTRAMUSCULAR; INTRAVENOUS; SUBCUTANEOUS EVERY 4 HOURS
Refills: 0 | Status: DISCONTINUED | OUTPATIENT
Start: 2020-12-16 | End: 2020-12-16

## 2020-12-16 RX ORDER — SODIUM CHLORIDE 9 MG/ML
1000 INJECTION, SOLUTION INTRAVENOUS
Refills: 0 | Status: DISCONTINUED | OUTPATIENT
Start: 2020-12-16 | End: 2020-12-18

## 2020-12-16 RX ORDER — SODIUM CHLORIDE 9 MG/ML
250 INJECTION, SOLUTION INTRAVENOUS ONCE
Refills: 0 | Status: COMPLETED | OUTPATIENT
Start: 2020-12-16 | End: 2020-12-16

## 2020-12-16 RX ORDER — ESLICARBAZEPINE ACETATE 800 MG/1
800 TABLET ORAL DAILY
Refills: 0 | Status: DISCONTINUED | OUTPATIENT
Start: 2020-12-16 | End: 2020-12-19

## 2020-12-16 RX ORDER — ACETAMINOPHEN 500 MG
650 TABLET ORAL EVERY 6 HOURS
Refills: 0 | Status: DISCONTINUED | OUTPATIENT
Start: 2020-12-16 | End: 2020-12-17

## 2020-12-16 RX ORDER — LACOSAMIDE 50 MG/1
200 TABLET ORAL
Refills: 0 | Status: DISCONTINUED | OUTPATIENT
Start: 2020-12-16 | End: 2020-12-18

## 2020-12-16 RX ORDER — HYDROMORPHONE HYDROCHLORIDE 2 MG/ML
0.5 INJECTION INTRAMUSCULAR; INTRAVENOUS; SUBCUTANEOUS ONCE
Refills: 0 | Status: DISCONTINUED | OUTPATIENT
Start: 2020-12-16 | End: 2020-12-17

## 2020-12-16 RX ORDER — CEFAZOLIN SODIUM 1 G
2000 VIAL (EA) INJECTION EVERY 8 HOURS
Refills: 0 | Status: COMPLETED | OUTPATIENT
Start: 2020-12-16 | End: 2020-12-17

## 2020-12-16 RX ORDER — HYDROMORPHONE HYDROCHLORIDE 2 MG/ML
0.5 INJECTION INTRAMUSCULAR; INTRAVENOUS; SUBCUTANEOUS EVERY 6 HOURS
Refills: 0 | Status: DISCONTINUED | OUTPATIENT
Start: 2020-12-16 | End: 2020-12-16

## 2020-12-16 RX ORDER — ALPRAZOLAM 0.25 MG
2 TABLET ORAL THREE TIMES A DAY
Refills: 0 | Status: DISCONTINUED | OUTPATIENT
Start: 2020-12-16 | End: 2020-12-19

## 2020-12-16 RX ORDER — ASPIRIN/CALCIUM CARB/MAGNESIUM 324 MG
81 TABLET ORAL DAILY
Refills: 0 | Status: DISCONTINUED | OUTPATIENT
Start: 2020-12-16 | End: 2020-12-19

## 2020-12-16 RX ADMIN — Medication 81 MILLIGRAM(S): at 17:30

## 2020-12-16 RX ADMIN — Medication 100 MILLIGRAM(S): at 19:39

## 2020-12-16 RX ADMIN — OXYCODONE HYDROCHLORIDE 10 MILLIGRAM(S): 5 TABLET ORAL at 20:23

## 2020-12-16 RX ADMIN — HYDROMORPHONE HYDROCHLORIDE 0.5 MILLIGRAM(S): 2 INJECTION INTRAMUSCULAR; INTRAVENOUS; SUBCUTANEOUS at 21:37

## 2020-12-16 RX ADMIN — Medication 650 MILLIGRAM(S): at 21:00

## 2020-12-16 RX ADMIN — ESLICARBAZEPINE ACETATE 800 MILLIGRAM(S): 800 TABLET ORAL at 21:37

## 2020-12-16 RX ADMIN — Medication 2 MILLIGRAM(S): at 23:12

## 2020-12-16 RX ADMIN — HYDROMORPHONE HYDROCHLORIDE 0.5 MILLIGRAM(S): 2 INJECTION INTRAMUSCULAR; INTRAVENOUS; SUBCUTANEOUS at 17:30

## 2020-12-16 RX ADMIN — HYDROMORPHONE HYDROCHLORIDE 0.5 MILLIGRAM(S): 2 INJECTION INTRAMUSCULAR; INTRAVENOUS; SUBCUTANEOUS at 22:00

## 2020-12-16 RX ADMIN — SODIUM CHLORIDE 1000 MILLILITER(S): 9 INJECTION, SOLUTION INTRAVENOUS at 15:24

## 2020-12-16 RX ADMIN — LACOSAMIDE 200 MILLIGRAM(S): 50 TABLET ORAL at 22:03

## 2020-12-16 RX ADMIN — OXYCODONE HYDROCHLORIDE 10 MILLIGRAM(S): 5 TABLET ORAL at 21:00

## 2020-12-16 RX ADMIN — Medication 650 MILLIGRAM(S): at 20:23

## 2020-12-16 RX ADMIN — HYDROMORPHONE HYDROCHLORIDE 0.5 MILLIGRAM(S): 2 INJECTION INTRAMUSCULAR; INTRAVENOUS; SUBCUTANEOUS at 17:45

## 2020-12-16 NOTE — H&P ADULT - NSICDXPASTMEDICALHX_GEN_ALL_CORE_FT
PAST MEDICAL HISTORY:  Ankle fracture left, 2016    Anxiety and depression     ETOH abuse Pt denies    H/O fall 6/14/19    Large bowel obstruction 2014, post op    Panic attacks     Rosacea     Seizures

## 2020-12-16 NOTE — H&P ADULT - NSICDXPASTSURGICALHX_GEN_ALL_CORE_FT
PAST SURGICAL HISTORY:  Elective surgery laparoscopy adhesions removed- sept 2020    Elective surgery screws removed from right shoulder oct 2020    H/O exploratory laparotomy     H/O total hysterectomy     H/O total shoulder replacement, right     History of open reduction and internal fixation (ORIF) procedure left ankle    History of rhinoplasty     History of tonsillectomy

## 2020-12-16 NOTE — H&P ADULT - NSHPPHYSICALEXAM_GEN_ALL_CORE
PE: Normal head, atraumatic. Normal skin, no rashes/lesions/erythema.  Right shoulder:  TTP of right shoulder  Sensation intact to light touch  Compartments are soft and compressible b/l, nonTTP  Radial pulse 2+   strength 5/5        Rest of PE per medical clearance.

## 2020-12-16 NOTE — H&P ADULT - HISTORY OF PRESENT ILLNESS
45yF here with right shoulder pain and instablity. States that she has pain with motion of it. Had surgery on right shoulder 3 weeks ago. Has intermittent numbess/tingling of fingers 1-2. Patient denies any cp, sob, fever, chills    Patient here for right shoulder open revision, subscapular repair, possible revision upsizing total shoulder replacement

## 2020-12-17 ENCOUNTER — TRANSCRIPTION ENCOUNTER (OUTPATIENT)
Age: 45
End: 2020-12-17

## 2020-12-17 DIAGNOSIS — E87.1 HYPO-OSMOLALITY AND HYPONATREMIA: ICD-10-CM

## 2020-12-17 DIAGNOSIS — D62 ACUTE POSTHEMORRHAGIC ANEMIA: ICD-10-CM

## 2020-12-17 DIAGNOSIS — Z98.890 OTHER SPECIFIED POSTPROCEDURAL STATES: ICD-10-CM

## 2020-12-17 LAB
ANION GAP SERPL CALC-SCNC: 15 MMOL/L — SIGNIFICANT CHANGE UP (ref 5–17)
BASOPHILS # BLD AUTO: 0.01 K/UL — SIGNIFICANT CHANGE UP (ref 0–0.2)
BASOPHILS NFR BLD AUTO: 0.1 % — SIGNIFICANT CHANGE UP (ref 0–2)
BUN SERPL-MCNC: 9 MG/DL — SIGNIFICANT CHANGE UP (ref 7–23)
CALCIUM SERPL-MCNC: 9.2 MG/DL — SIGNIFICANT CHANGE UP (ref 8.4–10.5)
CHLORIDE SERPL-SCNC: 95 MMOL/L — LOW (ref 96–108)
CO2 SERPL-SCNC: 23 MMOL/L — SIGNIFICANT CHANGE UP (ref 22–31)
CREAT SERPL-MCNC: 0.63 MG/DL — SIGNIFICANT CHANGE UP (ref 0.5–1.3)
EOSINOPHIL # BLD AUTO: 0.01 K/UL — SIGNIFICANT CHANGE UP (ref 0–0.5)
EOSINOPHIL NFR BLD AUTO: 0.1 % — SIGNIFICANT CHANGE UP (ref 0–6)
GLUCOSE SERPL-MCNC: 104 MG/DL — HIGH (ref 70–99)
HCT VFR BLD CALC: 28.5 % — LOW (ref 34.5–45)
HGB BLD-MCNC: 8.5 G/DL — LOW (ref 11.5–15.5)
IMM GRANULOCYTES NFR BLD AUTO: 0.4 % — SIGNIFICANT CHANGE UP (ref 0–1.5)
LYMPHOCYTES # BLD AUTO: 0.76 K/UL — LOW (ref 1–3.3)
LYMPHOCYTES # BLD AUTO: 10.6 % — LOW (ref 13–44)
MCHC RBC-ENTMCNC: 24.8 PG — LOW (ref 27–34)
MCHC RBC-ENTMCNC: 29.8 GM/DL — LOW (ref 32–36)
MCV RBC AUTO: 83.1 FL — SIGNIFICANT CHANGE UP (ref 80–100)
MONOCYTES # BLD AUTO: 0.84 K/UL — SIGNIFICANT CHANGE UP (ref 0–0.9)
MONOCYTES NFR BLD AUTO: 11.7 % — SIGNIFICANT CHANGE UP (ref 2–14)
NEUTROPHILS # BLD AUTO: 5.51 K/UL — SIGNIFICANT CHANGE UP (ref 1.8–7.4)
NEUTROPHILS NFR BLD AUTO: 77.1 % — HIGH (ref 43–77)
NRBC # BLD: 0 /100 WBCS — SIGNIFICANT CHANGE UP (ref 0–0)
PLATELET # BLD AUTO: 246 K/UL — SIGNIFICANT CHANGE UP (ref 150–400)
POTASSIUM SERPL-MCNC: 4.1 MMOL/L — SIGNIFICANT CHANGE UP (ref 3.5–5.3)
POTASSIUM SERPL-SCNC: 4.1 MMOL/L — SIGNIFICANT CHANGE UP (ref 3.5–5.3)
RBC # BLD: 3.43 M/UL — LOW (ref 3.8–5.2)
RBC # FLD: 15.3 % — HIGH (ref 10.3–14.5)
SODIUM SERPL-SCNC: 133 MMOL/L — LOW (ref 135–145)
WBC # BLD: 7.16 K/UL — SIGNIFICANT CHANGE UP (ref 3.8–10.5)
WBC # FLD AUTO: 7.16 K/UL — SIGNIFICANT CHANGE UP (ref 3.8–10.5)

## 2020-12-17 PROCEDURE — 99233 SBSQ HOSP IP/OBS HIGH 50: CPT | Mod: GC

## 2020-12-17 RX ORDER — SCOPALAMINE 1 MG/3D
1 PATCH, EXTENDED RELEASE TRANSDERMAL
Refills: 0 | Status: DISCONTINUED | OUTPATIENT
Start: 2020-12-17 | End: 2020-12-17

## 2020-12-17 RX ORDER — KETOROLAC TROMETHAMINE 30 MG/ML
15 SYRINGE (ML) INJECTION EVERY 6 HOURS
Refills: 0 | Status: DISCONTINUED | OUTPATIENT
Start: 2020-12-17 | End: 2020-12-17

## 2020-12-17 RX ORDER — TRAMADOL HYDROCHLORIDE 50 MG/1
25 TABLET ORAL EVERY 4 HOURS
Refills: 0 | Status: DISCONTINUED | OUTPATIENT
Start: 2020-12-17 | End: 2020-12-19

## 2020-12-17 RX ORDER — ONDANSETRON 8 MG/1
4 TABLET, FILM COATED ORAL EVERY 6 HOURS
Refills: 0 | Status: DISCONTINUED | OUTPATIENT
Start: 2020-12-17 | End: 2020-12-19

## 2020-12-17 RX ORDER — PANTOPRAZOLE SODIUM 20 MG/1
1 TABLET, DELAYED RELEASE ORAL
Qty: 0 | Refills: 0 | DISCHARGE
Start: 2020-12-17

## 2020-12-17 RX ORDER — ACETAMINOPHEN 500 MG
2 TABLET ORAL
Qty: 0 | Refills: 0 | DISCHARGE
Start: 2020-12-17

## 2020-12-17 RX ORDER — HYDROMORPHONE HYDROCHLORIDE 2 MG/ML
0.5 INJECTION INTRAMUSCULAR; INTRAVENOUS; SUBCUTANEOUS ONCE
Refills: 0 | Status: DISCONTINUED | OUTPATIENT
Start: 2020-12-17 | End: 2020-12-17

## 2020-12-17 RX ORDER — METOCLOPRAMIDE HCL 10 MG
10 TABLET ORAL ONCE
Refills: 0 | Status: COMPLETED | OUTPATIENT
Start: 2020-12-17 | End: 2020-12-17

## 2020-12-17 RX ORDER — ACETAMINOPHEN 500 MG
1000 TABLET ORAL EVERY 8 HOURS
Refills: 0 | Status: DISCONTINUED | OUTPATIENT
Start: 2020-12-17 | End: 2020-12-19

## 2020-12-17 RX ORDER — METOCLOPRAMIDE HCL 10 MG
10 TABLET ORAL ONCE
Refills: 0 | Status: DISCONTINUED | OUTPATIENT
Start: 2020-12-17 | End: 2020-12-17

## 2020-12-17 RX ORDER — TRAMADOL HYDROCHLORIDE 50 MG/1
50 TABLET ORAL EVERY 4 HOURS
Refills: 0 | Status: DISCONTINUED | OUTPATIENT
Start: 2020-12-17 | End: 2020-12-19

## 2020-12-17 RX ORDER — OXYCODONE HYDROCHLORIDE 5 MG/1
1 TABLET ORAL
Qty: 0 | Refills: 0 | DISCHARGE

## 2020-12-17 RX ADMIN — OXYCODONE HYDROCHLORIDE 10 MILLIGRAM(S): 5 TABLET ORAL at 00:25

## 2020-12-17 RX ADMIN — Medication 650 MILLIGRAM(S): at 10:08

## 2020-12-17 RX ADMIN — Medication 15 MILLIGRAM(S): at 23:15

## 2020-12-17 RX ADMIN — Medication 650 MILLIGRAM(S): at 00:00

## 2020-12-17 RX ADMIN — OXYCODONE HYDROCHLORIDE 10 MILLIGRAM(S): 5 TABLET ORAL at 01:00

## 2020-12-17 RX ADMIN — Medication 2 MILLIGRAM(S): at 06:02

## 2020-12-17 RX ADMIN — Medication 15 MILLIGRAM(S): at 10:03

## 2020-12-17 RX ADMIN — PANTOPRAZOLE SODIUM 40 MILLIGRAM(S): 20 TABLET, DELAYED RELEASE ORAL at 06:01

## 2020-12-17 RX ADMIN — Medication 650 MILLIGRAM(S): at 16:37

## 2020-12-17 RX ADMIN — Medication 10 MILLIGRAM(S): at 10:03

## 2020-12-17 RX ADMIN — Medication 1000 MILLIGRAM(S): at 21:24

## 2020-12-17 RX ADMIN — HYDROMORPHONE HYDROCHLORIDE 0.5 MILLIGRAM(S): 2 INJECTION INTRAMUSCULAR; INTRAVENOUS; SUBCUTANEOUS at 11:08

## 2020-12-17 RX ADMIN — HYDROMORPHONE HYDROCHLORIDE 0.5 MILLIGRAM(S): 2 INJECTION INTRAMUSCULAR; INTRAVENOUS; SUBCUTANEOUS at 01:17

## 2020-12-17 RX ADMIN — Medication 15 MILLIGRAM(S): at 16:50

## 2020-12-17 RX ADMIN — TRAMADOL HYDROCHLORIDE 50 MILLIGRAM(S): 50 TABLET ORAL at 22:42

## 2020-12-17 RX ADMIN — Medication 81 MILLIGRAM(S): at 12:12

## 2020-12-17 RX ADMIN — HYDROMORPHONE HYDROCHLORIDE 0.5 MILLIGRAM(S): 2 INJECTION INTRAMUSCULAR; INTRAVENOUS; SUBCUTANEOUS at 02:30

## 2020-12-17 RX ADMIN — HYDROMORPHONE HYDROCHLORIDE 0.5 MILLIGRAM(S): 2 INJECTION INTRAMUSCULAR; INTRAVENOUS; SUBCUTANEOUS at 12:12

## 2020-12-17 RX ADMIN — Medication 650 MILLIGRAM(S): at 09:06

## 2020-12-17 RX ADMIN — OXYCODONE HYDROCHLORIDE 10 MILLIGRAM(S): 5 TABLET ORAL at 10:06

## 2020-12-17 RX ADMIN — Medication 15 MILLIGRAM(S): at 23:30

## 2020-12-17 RX ADMIN — Medication 15 MILLIGRAM(S): at 16:37

## 2020-12-17 RX ADMIN — OXYCODONE HYDROCHLORIDE 10 MILLIGRAM(S): 5 TABLET ORAL at 09:06

## 2020-12-17 RX ADMIN — TRAMADOL HYDROCHLORIDE 50 MILLIGRAM(S): 50 TABLET ORAL at 21:42

## 2020-12-17 RX ADMIN — ESLICARBAZEPINE ACETATE 800 MILLIGRAM(S): 800 TABLET ORAL at 12:12

## 2020-12-17 RX ADMIN — HYDROMORPHONE HYDROCHLORIDE 0.5 MILLIGRAM(S): 2 INJECTION INTRAMUSCULAR; INTRAVENOUS; SUBCUTANEOUS at 02:02

## 2020-12-17 RX ADMIN — LACOSAMIDE 200 MILLIGRAM(S): 50 TABLET ORAL at 23:14

## 2020-12-17 RX ADMIN — SCOPALAMINE 1 PATCH: 1 PATCH, EXTENDED RELEASE TRANSDERMAL at 10:03

## 2020-12-17 RX ADMIN — Medication 15 MILLIGRAM(S): at 11:07

## 2020-12-17 RX ADMIN — Medication 1000 MILLIGRAM(S): at 22:24

## 2020-12-17 RX ADMIN — Medication 2 MILLIGRAM(S): at 23:19

## 2020-12-17 RX ADMIN — HYDROMORPHONE HYDROCHLORIDE 0.5 MILLIGRAM(S): 2 INJECTION INTRAMUSCULAR; INTRAVENOUS; SUBCUTANEOUS at 10:08

## 2020-12-17 RX ADMIN — Medication 100 MILLIGRAM(S): at 02:02

## 2020-12-17 RX ADMIN — ONDANSETRON 4 MILLIGRAM(S): 8 TABLET, FILM COATED ORAL at 06:02

## 2020-12-17 NOTE — DISCHARGE NOTE PROVIDER - NSDCCPTREATMENT_GEN_ALL_CORE_FT
PRINCIPAL PROCEDURE  Procedure: Total shoulder replacement  Findings and Treatment: R total shoulder revision

## 2020-12-17 NOTE — DISCHARGE NOTE PROVIDER - NSDCMRMEDTOKEN_GEN_ALL_CORE_FT
acetaminophen 325 mg oral tablet: 2 tab(s) orally every 6 hours, As needed, Temp greater or equal to 38C (100.4F), Mild Pain (1 - 3)  Aptiom 800 mg oral tablet: orally once a day (at bedtime)  aspirin 81 mg oral tablet:   Dexilant 60 mg oral delayed release capsule: 1 cap(s) orally once a day  Vimpat 200 mg oral tablet: orally once a day  Xanax 2 mg oral tablet: 1 tab(s) orally 3 times a day   acetaminophen 325 mg oral tablet: 2 tab(s) orally every 6 hours, As needed, Temp greater or equal to 38C (100.4F), Mild Pain (1 - 3)  Aptiom 800 mg oral tablet: orally once a day (at bedtime)  aspirin 81 mg oral tablet:   Dexilant 60 mg oral delayed release capsule: 1 cap(s) orally once a day  doxycycline 20 mg oral capsule: 1 cap(s) orally every 12 hours  Vimpat 200 mg oral tablet: orally 2 times a day  Xanax 2 mg oral tablet: 1 tab(s) orally 3 times a day   Aptiom 800 mg oral tablet: orally once a day (at bedtime)  aspirin 81 mg oral tablet:   Dexilant 60 mg oral delayed release capsule: 1 cap(s) orally once a day  Dilaudid 2 mg oral tablet: 1 tab(s) orally every 6 to 8 hours, As Needed  -for severe pain MDD:3  doxycycline 20 mg oral capsule: 1 cap(s) orally every 12 hours  Tylenol Extra Strength 500 mg oral tablet: 2 tab(s) orally every 8 hours x 7 days  Vimpat 200 mg oral tablet: orally 2 times a day  Xanax 2 mg oral tablet: 1 tab(s) orally 3 times a day

## 2020-12-17 NOTE — DISCHARGE NOTE PROVIDER - NSDCACTIVITY_GEN_ALL_CORE
Do not drive or operate machinery/Showering allowed/Walking - Indoors allowed/No heavy lifting/straining Do not drive or operate machinery/Showering allowed/Walking - Indoors allowed/No heavy lifting/straining/Walking - Outdoors allowed

## 2020-12-17 NOTE — DISCHARGE NOTE PROVIDER - NSDCFUADDINST_GEN_ALL_CORE_FT
Non weight bearing R upper extremity  No strenuous activity, heavy lifting, driving or returning to work until cleared by MD.  You may shower - dressing is water-resistant, no soaking in bathtubs.  Keep arm in sling at all times, except when bathing or showering.   Remove dressing after post op day 5-7, then leave incision open to air. Keep incision clean and dry.  Try to have regular bowel movements, take stool softener or laxative if necessary.  Swelling may travel all the way down leg to foot, this is normal and will subside in a few weeks.  Call to schedule an appt with Dr. Aponte for follow up.  Contact your doctor if you experience: fever greater than 101.5, chills, chest pain, difficulty breathing, redness or excessive drainage around the incision, other concerns.  Follow up with your primary care provider.   Non weight bearing R upper extremity  No strenuous activity, heavy lifting, driving or returning to work until cleared by MD.  You may shower - dressing is water-resistant, no soaking in bathtubs.  Keep arm in sling at all times, except when bathing or showering.   Remove dressing after post op day 5-7, then leave incision open to air. Keep incision clean and dry.  Try to have regular bowel movements, take stool softener or laxative if necessary.  Swelling may travel all the way down leg to foot, this is normal and will subside in a few weeks.  Call to schedule an appt with Dr. Aponte for follow up.  Contact your doctor if you experience: fever greater than 101.5, chills, chest pain, difficulty breathing, redness or excessive drainage around the incision, other concerns.  You had a low sodium while hospitalized at [facility], you should followup with your primary care provider within a few days for a followup blood draw- BMP to assess sodium.   You were anemic during your hospital stay, you should followup with your primary care provider in a few days for a followup blood draw- CBC   Non weight bearing R upper extremity  No strenuous activity, heavy lifting, driving or returning to work until cleared by MD.  You may shower - dressing is water-resistant, no soaking in bathtubs.  Keep arm in sling at all times, except when bathing or showering.   Remove dressing after post op day 5-7, then leave incision open to air. Keep incision clean and dry.  Try to have regular bowel movements, take stool softener or laxative if necessary.  Swelling may travel all the way down leg to foot, this is normal and will subside in a few weeks.  Call to schedule an appt with Dr. Aponte for follow up.  Contact your doctor if you experience: fever greater than 101.5, chills, chest pain, difficulty breathing, redness or excessive drainage around the incision, other concerns.  You had a low sodium while hospitalized at [facility], you should followup with your primary care provider within a few days for a followup blood draw- BMP to assess sodium.   You were anemic during your hospital stay, you should followup with your primary care provider in a few days for a followup blood draw- CBC.    Dilaudid was prescribed for home. Do not take any remaining oxycodone may have at home. Caution with timing of home Xanax with Dilaudid. DO NOT take at or about same time. Take tylenol to help control pain and use Dilaudid only for severe pain.

## 2020-12-17 NOTE — DISCHARGE NOTE PROVIDER - HOSPITAL COURSE
Admitted  Surgery  [facility]en-op Antibiotics  Pain control  DVT prophylaxis  OOB/Physical Therapy Admitted for surgery  Surgery - revision Total shoulder arthroplasty  [facility]en-op Antibiotics  Pain control  DVT prophylaxis - ASA, SCDs  OOB/Physical Therapy

## 2020-12-17 NOTE — DISCHARGE NOTE PROVIDER - NSDCCPCAREPLAN_GEN_ALL_CORE_FT
PRINCIPAL DISCHARGE DIAGNOSIS  Diagnosis: Right shoulder pain  Assessment and Plan of Treatment: S/p partial revision TSR

## 2020-12-17 NOTE — DISCHARGE NOTE PROVIDER - CARE PROVIDER_API CALL
Kings Aponte)  Orthopaedic Surgery  [address] [place] [address]  Phone: [phone]  Fax: [phone]  Follow Up Time:

## 2020-12-17 NOTE — OCCUPATIONAL THERAPY INITIAL EVALUATION ADULT - PERTINENT HX OF CURRENT PROBLEM, REHAB EVAL
45yF here with right shoulder pain and instablity. States that she has pain with motion of it. Had surgery on right shoulder 3 weeks ago. Has intermittent numbess/tingling of fingers 1-2. Patient denies any cp, sob, fever, chills

## 2020-12-18 LAB
ALBUMIN SERPL ELPH-MCNC: 3 G/DL — LOW (ref 3.3–5)
ALBUMIN SERPL ELPH-MCNC: 3.3 G/DL — SIGNIFICANT CHANGE UP (ref 3.3–5)
ALP SERPL-CCNC: 61 U/L — SIGNIFICANT CHANGE UP (ref 40–120)
ALP SERPL-CCNC: 74 U/L — SIGNIFICANT CHANGE UP (ref 40–120)
ALT FLD-CCNC: <5 U/L — LOW (ref 10–45)
ALT FLD-CCNC: <5 U/L — LOW (ref 10–45)
ANION GAP SERPL CALC-SCNC: 11 MMOL/L — SIGNIFICANT CHANGE UP (ref 5–17)
ANION GAP SERPL CALC-SCNC: 11 MMOL/L — SIGNIFICANT CHANGE UP (ref 5–17)
ANION GAP SERPL CALC-SCNC: 14 MMOL/L — SIGNIFICANT CHANGE UP (ref 5–17)
ANION GAP SERPL CALC-SCNC: 15 MMOL/L — SIGNIFICANT CHANGE UP (ref 5–17)
APPEARANCE UR: CLEAR — SIGNIFICANT CHANGE UP
AST SERPL-CCNC: 11 U/L — SIGNIFICANT CHANGE UP (ref 10–40)
AST SERPL-CCNC: 15 U/L — SIGNIFICANT CHANGE UP (ref 10–40)
BILIRUB SERPL-MCNC: 0.3 MG/DL — SIGNIFICANT CHANGE UP (ref 0.2–1.2)
BILIRUB SERPL-MCNC: 0.3 MG/DL — SIGNIFICANT CHANGE UP (ref 0.2–1.2)
BILIRUB UR-MCNC: NEGATIVE — SIGNIFICANT CHANGE UP
BUN SERPL-MCNC: 6 MG/DL — LOW (ref 7–23)
CALCIUM SERPL-MCNC: 8.5 MG/DL — SIGNIFICANT CHANGE UP (ref 8.4–10.5)
CALCIUM SERPL-MCNC: 8.6 MG/DL — SIGNIFICANT CHANGE UP (ref 8.4–10.5)
CALCIUM SERPL-MCNC: 8.6 MG/DL — SIGNIFICANT CHANGE UP (ref 8.4–10.5)
CALCIUM SERPL-MCNC: 8.7 MG/DL — SIGNIFICANT CHANGE UP (ref 8.4–10.5)
CHLORIDE SERPL-SCNC: 92 MMOL/L — LOW (ref 96–108)
CHLORIDE SERPL-SCNC: 92 MMOL/L — LOW (ref 96–108)
CHLORIDE SERPL-SCNC: 93 MMOL/L — LOW (ref 96–108)
CHLORIDE SERPL-SCNC: 95 MMOL/L — LOW (ref 96–108)
CO2 SERPL-SCNC: 19 MMOL/L — LOW (ref 22–31)
CO2 SERPL-SCNC: 20 MMOL/L — LOW (ref 22–31)
CO2 SERPL-SCNC: 20 MMOL/L — LOW (ref 22–31)
CO2 SERPL-SCNC: 22 MMOL/L — SIGNIFICANT CHANGE UP (ref 22–31)
COLOR SPEC: YELLOW — SIGNIFICANT CHANGE UP
CREAT ?TM UR-MCNC: 41 MG/DL — SIGNIFICANT CHANGE UP
CREAT SERPL-MCNC: 0.6 MG/DL — SIGNIFICANT CHANGE UP (ref 0.5–1.3)
CREAT SERPL-MCNC: 0.61 MG/DL — SIGNIFICANT CHANGE UP (ref 0.5–1.3)
CREAT SERPL-MCNC: 0.62 MG/DL — SIGNIFICANT CHANGE UP (ref 0.5–1.3)
CREAT SERPL-MCNC: 0.68 MG/DL — SIGNIFICANT CHANGE UP (ref 0.5–1.3)
DIFF PNL FLD: NEGATIVE — SIGNIFICANT CHANGE UP
GLUCOSE SERPL-MCNC: 59 MG/DL — LOW (ref 70–99)
GLUCOSE SERPL-MCNC: 71 MG/DL — SIGNIFICANT CHANGE UP (ref 70–99)
GLUCOSE SERPL-MCNC: 75 MG/DL — SIGNIFICANT CHANGE UP (ref 70–99)
GLUCOSE SERPL-MCNC: 76 MG/DL — SIGNIFICANT CHANGE UP (ref 70–99)
GLUCOSE UR QL: NEGATIVE — SIGNIFICANT CHANGE UP
HCT VFR BLD CALC: 23.3 % — LOW (ref 34.5–45)
HCT VFR BLD CALC: 23.5 % — LOW (ref 34.5–45)
HCT VFR BLD CALC: 23.6 % — LOW (ref 34.5–45)
HGB BLD-MCNC: 7.2 G/DL — LOW (ref 11.5–15.5)
HGB BLD-MCNC: 7.2 G/DL — LOW (ref 11.5–15.5)
HGB BLD-MCNC: 7.4 G/DL — LOW (ref 11.5–15.5)
KETONES UR-MCNC: 40 MG/DL
LEUKOCYTE ESTERASE UR-ACNC: NEGATIVE — SIGNIFICANT CHANGE UP
MCHC RBC-ENTMCNC: 24.6 PG — LOW (ref 27–34)
MCHC RBC-ENTMCNC: 24.7 PG — LOW (ref 27–34)
MCHC RBC-ENTMCNC: 25.1 PG — LOW (ref 27–34)
MCHC RBC-ENTMCNC: 30.6 GM/DL — LOW (ref 32–36)
MCHC RBC-ENTMCNC: 30.9 GM/DL — LOW (ref 32–36)
MCHC RBC-ENTMCNC: 31.4 GM/DL — LOW (ref 32–36)
MCV RBC AUTO: 78.7 FL — LOW (ref 80–100)
MCV RBC AUTO: 80.2 FL — SIGNIFICANT CHANGE UP (ref 80–100)
MCV RBC AUTO: 81.2 FL — SIGNIFICANT CHANGE UP (ref 80–100)
NITRITE UR-MCNC: NEGATIVE — SIGNIFICANT CHANGE UP
NRBC # BLD: 0 /100 WBCS — SIGNIFICANT CHANGE UP (ref 0–0)
OSMOLALITY UR: 183 MOSM/KG — LOW (ref 300–900)
PH UR: 5.5 — SIGNIFICANT CHANGE UP (ref 5–8)
PLATELET # BLD AUTO: 191 K/UL — SIGNIFICANT CHANGE UP (ref 150–400)
PLATELET # BLD AUTO: 199 K/UL — SIGNIFICANT CHANGE UP (ref 150–400)
PLATELET # BLD AUTO: 199 K/UL — SIGNIFICANT CHANGE UP (ref 150–400)
POTASSIUM SERPL-MCNC: 3.5 MMOL/L — SIGNIFICANT CHANGE UP (ref 3.5–5.3)
POTASSIUM SERPL-MCNC: 3.9 MMOL/L — SIGNIFICANT CHANGE UP (ref 3.5–5.3)
POTASSIUM SERPL-MCNC: 4 MMOL/L — SIGNIFICANT CHANGE UP (ref 3.5–5.3)
POTASSIUM SERPL-MCNC: 4.5 MMOL/L — SIGNIFICANT CHANGE UP (ref 3.5–5.3)
POTASSIUM SERPL-SCNC: 3.5 MMOL/L — SIGNIFICANT CHANGE UP (ref 3.5–5.3)
POTASSIUM SERPL-SCNC: 3.9 MMOL/L — SIGNIFICANT CHANGE UP (ref 3.5–5.3)
POTASSIUM SERPL-SCNC: 4 MMOL/L — SIGNIFICANT CHANGE UP (ref 3.5–5.3)
POTASSIUM SERPL-SCNC: 4.5 MMOL/L — SIGNIFICANT CHANGE UP (ref 3.5–5.3)
PROT SERPL-MCNC: 5.5 G/DL — LOW (ref 6–8.3)
PROT SERPL-MCNC: 6.2 G/DL — SIGNIFICANT CHANGE UP (ref 6–8.3)
PROT UR-MCNC: NEGATIVE MG/DL — SIGNIFICANT CHANGE UP
RBC # BLD: 2.87 M/UL — LOW (ref 3.8–5.2)
RBC # BLD: 2.93 M/UL — LOW (ref 3.8–5.2)
RBC # BLD: 3 M/UL — LOW (ref 3.8–5.2)
RBC # FLD: 15.2 % — HIGH (ref 10.3–14.5)
RBC # FLD: 15.3 % — HIGH (ref 10.3–14.5)
RBC # FLD: 15.4 % — HIGH (ref 10.3–14.5)
SODIUM SERPL-SCNC: 124 MMOL/L — LOW (ref 135–145)
SODIUM SERPL-SCNC: 125 MMOL/L — LOW (ref 135–145)
SODIUM SERPL-SCNC: 126 MMOL/L — LOW (ref 135–145)
SODIUM SERPL-SCNC: 129 MMOL/L — LOW (ref 135–145)
SODIUM UR-SCNC: <20 MMOL/L — SIGNIFICANT CHANGE UP
SP GR SPEC: 1.01 — SIGNIFICANT CHANGE UP (ref 1–1.03)
T4 AB SER-ACNC: 5.38 UG/DL — SIGNIFICANT CHANGE UP (ref 3.17–11.72)
TSH SERPL-MCNC: 0.7 UIU/ML — SIGNIFICANT CHANGE UP (ref 0.35–4.94)
URATE SERPL-MCNC: 3.5 MG/DL — SIGNIFICANT CHANGE UP (ref 2.5–7)
URATE UR-MCNC: 5.1 MG/DL — SIGNIFICANT CHANGE UP
UROBILINOGEN FLD QL: 0.2 E.U./DL — SIGNIFICANT CHANGE UP
WBC # BLD: 6.33 K/UL — SIGNIFICANT CHANGE UP (ref 3.8–10.5)
WBC # BLD: 6.4 K/UL — SIGNIFICANT CHANGE UP (ref 3.8–10.5)
WBC # BLD: 7.01 K/UL — SIGNIFICANT CHANGE UP (ref 3.8–10.5)
WBC # FLD AUTO: 6.33 K/UL — SIGNIFICANT CHANGE UP (ref 3.8–10.5)
WBC # FLD AUTO: 6.4 K/UL — SIGNIFICANT CHANGE UP (ref 3.8–10.5)
WBC # FLD AUTO: 7.01 K/UL — SIGNIFICANT CHANGE UP (ref 3.8–10.5)

## 2020-12-18 PROCEDURE — 99232 SBSQ HOSP IP/OBS MODERATE 35: CPT | Mod: GC

## 2020-12-18 RX ORDER — LACOSAMIDE 50 MG/1
200 TABLET ORAL
Refills: 0 | Status: DISCONTINUED | OUTPATIENT
Start: 2020-12-18 | End: 2020-12-19

## 2020-12-18 RX ORDER — LACOSAMIDE 50 MG/1
0 TABLET ORAL
Qty: 0 | Refills: 0 | DISCHARGE

## 2020-12-18 RX ADMIN — LACOSAMIDE 200 MILLIGRAM(S): 50 TABLET ORAL at 09:58

## 2020-12-18 RX ADMIN — Medication 1000 MILLIGRAM(S): at 14:35

## 2020-12-18 RX ADMIN — Medication 1000 MILLIGRAM(S): at 05:56

## 2020-12-18 RX ADMIN — TRAMADOL HYDROCHLORIDE 50 MILLIGRAM(S): 50 TABLET ORAL at 06:56

## 2020-12-18 RX ADMIN — TRAMADOL HYDROCHLORIDE 50 MILLIGRAM(S): 50 TABLET ORAL at 22:06

## 2020-12-18 RX ADMIN — TRAMADOL HYDROCHLORIDE 50 MILLIGRAM(S): 50 TABLET ORAL at 23:06

## 2020-12-18 RX ADMIN — Medication 1000 MILLIGRAM(S): at 13:35

## 2020-12-18 RX ADMIN — TRAMADOL HYDROCHLORIDE 50 MILLIGRAM(S): 50 TABLET ORAL at 05:56

## 2020-12-18 RX ADMIN — Medication 2 MILLIGRAM(S): at 21:17

## 2020-12-18 RX ADMIN — Medication 1000 MILLIGRAM(S): at 21:18

## 2020-12-18 RX ADMIN — Medication 2 MILLIGRAM(S): at 13:38

## 2020-12-18 RX ADMIN — Medication 2 MILLIGRAM(S): at 07:15

## 2020-12-18 RX ADMIN — ESLICARBAZEPINE ACETATE 800 MILLIGRAM(S): 800 TABLET ORAL at 21:18

## 2020-12-18 RX ADMIN — Medication 81 MILLIGRAM(S): at 11:47

## 2020-12-18 RX ADMIN — TRAMADOL HYDROCHLORIDE 50 MILLIGRAM(S): 50 TABLET ORAL at 18:51

## 2020-12-18 RX ADMIN — LACOSAMIDE 200 MILLIGRAM(S): 50 TABLET ORAL at 21:18

## 2020-12-18 RX ADMIN — Medication 1000 MILLIGRAM(S): at 06:56

## 2020-12-18 RX ADMIN — Medication 1000 MILLIGRAM(S): at 22:18

## 2020-12-18 RX ADMIN — PANTOPRAZOLE SODIUM 40 MILLIGRAM(S): 20 TABLET, DELAYED RELEASE ORAL at 05:56

## 2020-12-18 RX ADMIN — TRAMADOL HYDROCHLORIDE 50 MILLIGRAM(S): 50 TABLET ORAL at 17:43

## 2020-12-18 NOTE — PROGRESS NOTE ADULT - PROBLEM SELECTOR PLAN 2
fluid restriction
fluid restriction and Na increased.  I removed the extra water pitchs away from the bedside and discussed with the patinet

## 2020-12-18 NOTE — PROGRESS NOTE ADULT - PROBLEM SELECTOR PLAN 3
The patient is hemodynamically stable.  The pain is controlled.  Patient is on DVT prophylaxis.  Patient is using incentive spirometry.  Oxygen saturation is acceptable.  Advance diet as tolerated.  Advance activity as tolerated.  Monitor for ileus.  Patient is on Laxatives.  Surgical wound is stable.  No indication for monitor bed.
The patient is hemodynamically stable.  The pain is controlled.  Patient is on DVT prophylaxis.  Patient is using incentive spirometry.  Oxygen saturation is acceptable.  Advance diet as tolerated.  Advance activity as tolerated.  Monitor for ileus.  Patient is on Laxatives.  Surgical wound is stable.  No indication for monitor bed.

## 2020-12-18 NOTE — PROGRESS NOTE ADULT - PROBLEM SELECTOR PLAN 1
Monitor hemoglobin. Hold transfusion unless hemoglobin is 7, 8 in patient with coronary artery disease, hemodynamic instability such as tachycardia/hypotension, or there is evidence of acute blood loss.  Anemia is due to postoperative blood loss
Monitor hemoglobin. Hold transfusion unless hemoglobin is 7, 8 in patient with coronary artery disease, hemodynamic instability such as tachycardia/hypotension, or there is evidence of acute blood loss.  Anemia is due to postoperative blood loss.  The repat Hb is stable and no evidence of acute bleed.  Follow cbc

## 2020-12-19 ENCOUNTER — TRANSCRIPTION ENCOUNTER (OUTPATIENT)
Age: 45
End: 2020-12-19

## 2020-12-19 VITALS
HEART RATE: 99 BPM | TEMPERATURE: 98 F | SYSTOLIC BLOOD PRESSURE: 118 MMHG | OXYGEN SATURATION: 94 % | DIASTOLIC BLOOD PRESSURE: 68 MMHG | RESPIRATION RATE: 18 BRPM

## 2020-12-19 LAB
ANION GAP SERPL CALC-SCNC: 12 MMOL/L — SIGNIFICANT CHANGE UP (ref 5–17)
BUN SERPL-MCNC: 7 MG/DL — SIGNIFICANT CHANGE UP (ref 7–23)
CALCIUM SERPL-MCNC: 8.9 MG/DL — SIGNIFICANT CHANGE UP (ref 8.4–10.5)
CHLORIDE SERPL-SCNC: 100 MMOL/L — SIGNIFICANT CHANGE UP (ref 96–108)
CO2 SERPL-SCNC: 22 MMOL/L — SIGNIFICANT CHANGE UP (ref 22–31)
CREAT SERPL-MCNC: 0.63 MG/DL — SIGNIFICANT CHANGE UP (ref 0.5–1.3)
GLUCOSE SERPL-MCNC: 83 MG/DL — SIGNIFICANT CHANGE UP (ref 70–99)
HCT VFR BLD CALC: 24.6 % — LOW (ref 34.5–45)
HGB BLD-MCNC: 7.4 G/DL — LOW (ref 11.5–15.5)
MCHC RBC-ENTMCNC: 24.6 PG — LOW (ref 27–34)
MCHC RBC-ENTMCNC: 30.1 GM/DL — LOW (ref 32–36)
MCV RBC AUTO: 81.7 FL — SIGNIFICANT CHANGE UP (ref 80–100)
NRBC # BLD: 0 /100 WBCS — SIGNIFICANT CHANGE UP (ref 0–0)
PLATELET # BLD AUTO: 235 K/UL — SIGNIFICANT CHANGE UP (ref 150–400)
POTASSIUM SERPL-MCNC: 4.3 MMOL/L — SIGNIFICANT CHANGE UP (ref 3.5–5.3)
POTASSIUM SERPL-SCNC: 4.3 MMOL/L — SIGNIFICANT CHANGE UP (ref 3.5–5.3)
RBC # BLD: 3.01 M/UL — LOW (ref 3.8–5.2)
RBC # FLD: 15.5 % — HIGH (ref 10.3–14.5)
SODIUM SERPL-SCNC: 134 MMOL/L — LOW (ref 135–145)
WBC # BLD: 4.1 K/UL — SIGNIFICANT CHANGE UP (ref 3.8–10.5)
WBC # FLD AUTO: 4.1 K/UL — SIGNIFICANT CHANGE UP (ref 3.8–10.5)

## 2020-12-19 RX ORDER — HYDROMORPHONE HYDROCHLORIDE 2 MG/ML
1 INJECTION INTRAMUSCULAR; INTRAVENOUS; SUBCUTANEOUS
Qty: 21 | Refills: 0
Start: 2020-12-19 | End: 2020-12-25

## 2020-12-19 RX ORDER — ACETAMINOPHEN 500 MG
2 TABLET ORAL
Qty: 0 | Refills: 0 | DISCHARGE
Start: 2020-12-19

## 2020-12-19 RX ORDER — HYDROMORPHONE HYDROCHLORIDE 2 MG/ML
0.5 INJECTION INTRAMUSCULAR; INTRAVENOUS; SUBCUTANEOUS ONCE
Refills: 0 | Status: DISCONTINUED | OUTPATIENT
Start: 2020-12-19 | End: 2020-12-19

## 2020-12-19 RX ADMIN — TRAMADOL HYDROCHLORIDE 50 MILLIGRAM(S): 50 TABLET ORAL at 03:09

## 2020-12-19 RX ADMIN — Medication 1000 MILLIGRAM(S): at 05:56

## 2020-12-19 RX ADMIN — TRAMADOL HYDROCHLORIDE 50 MILLIGRAM(S): 50 TABLET ORAL at 05:56

## 2020-12-19 RX ADMIN — Medication 2 MILLIGRAM(S): at 02:46

## 2020-12-19 RX ADMIN — Medication 2 MILLIGRAM(S): at 12:08

## 2020-12-19 RX ADMIN — HYDROMORPHONE HYDROCHLORIDE 0.5 MILLIGRAM(S): 2 INJECTION INTRAMUSCULAR; INTRAVENOUS; SUBCUTANEOUS at 10:09

## 2020-12-19 RX ADMIN — TRAMADOL HYDROCHLORIDE 50 MILLIGRAM(S): 50 TABLET ORAL at 06:56

## 2020-12-19 RX ADMIN — Medication 81 MILLIGRAM(S): at 12:08

## 2020-12-19 RX ADMIN — HYDROMORPHONE HYDROCHLORIDE 0.5 MILLIGRAM(S): 2 INJECTION INTRAMUSCULAR; INTRAVENOUS; SUBCUTANEOUS at 09:54

## 2020-12-19 RX ADMIN — TRAMADOL HYDROCHLORIDE 50 MILLIGRAM(S): 50 TABLET ORAL at 02:09

## 2020-12-19 RX ADMIN — LACOSAMIDE 200 MILLIGRAM(S): 50 TABLET ORAL at 09:51

## 2020-12-19 RX ADMIN — PANTOPRAZOLE SODIUM 40 MILLIGRAM(S): 20 TABLET, DELAYED RELEASE ORAL at 05:56

## 2020-12-19 NOTE — PROGRESS NOTE ADULT - PROVIDER SPECIALTY LIST ADULT
Orthopedics
Internal Medicine
Internal Medicine

## 2020-12-19 NOTE — DISCHARGE NOTE NURSING/CASE MANAGEMENT/SOCIAL WORK - PATIENT PORTAL LINK FT
You can access the FollowMyHealth Patient Portal offered by [facility] by registering at the following website: [email or web address]. By joining [facility]’s FollowMyHealth portal, you will also be able to view your health information using other applications (apps) compatible with our system.

## 2020-12-19 NOTE — PROGRESS NOTE ADULT - REASON FOR ADMISSION
right shoulder pain
right shoulder pain
right shoulder pain/surgery
right shoulder pain

## 2020-12-19 NOTE — PROGRESS NOTE ADULT - SUBJECTIVE AND OBJECTIVE BOX
Ortho Note    Pt states she still has alot of pain, wants stronger pain meds  able to ambulate without issues.   Denies CP, SOB, N/V, numbness/tingling     Vital Signs Last 24 Hrs  T(C): 37.3 (12-18-20 @ 05:57), Max: 37.3 (12-18-20 @ 05:57)  T(F): 99.2 (12-18-20 @ 05:57), Max: 99.2 (12-18-20 @ 05:57)  HR: 104 (12-18-20 @ 05:57) (104 - 104)  BP: 108/69 (12-18-20 @ 05:57) (108/69 - 108/69)  BP(mean): --  RR: 16 (12-18-20 @ 05:57) (16 - 16)  SpO2: 95% (12-18-20 @ 05:57) (95% - 95%)    General: Pt Alert and oriented, NAD  R shoulder aquacell DSG C/D/I  Sensation intact C5-T1  Axillary/AIN/PIN/U grossly intact  2+ rad pulse   Fingers WWP                          7.2    6.40  )-----------( 199      ( 18 Dec 2020 07:00 )             23.3   18 Dec 2020 07:00    x      |  92     |  x      ----------------------------<  71     4.0     |  22     |  0.61     Ca    8.5        18 Dec 2020 07:00    TPro  5.5    /  Alb  3.0    /  TBili  0.3    /  DBili  x      /  AST  11     /  ALT  <5     /  AlkPhos  61     18 Dec 2020 07:00      A/P: 45yFemale s/p R shoulder david 12/16  - Stable  - Pain Control  - DVT ppx: SCDs  - PT, WBS: NWB RUE in sling   - Dispo home    Ortho Pager [phone]
Ortho Note    Subjective:  Pt comfortable without complaints, pain managed with current regimen when given as prescribed. RUE in sling.  Patient reports nausea denies vomiting, scopolamine patch started, x1 dose of reglan given. Given with good effect.   Denies CP, SOB, N/V, numbness/tingling       Vital Signs Last 24 Hrs  T(C): 36.7 (12-17-20 @ 09:17), Max: 36.7 (12-17-20 @ 09:17)  T(F): 98.1 (12-17-20 @ 09:17), Max: 98.1 (12-17-20 @ 09:17)  HR: 110 (12-17-20 @ 10:11) (110 - 115)  BP: 110/73 (12-17-20 @ 09:17) (110/73 - 110/73)  BP(mean): --  RR: 12 (12-17-20 @ 10:11) (12 - 16)  SpO2: 96% (12-17-20 @ 10:11) (96% - 100%)  AVSS    Objective:    Physical Exam:  General: Pt Alert and oriented, NAD  R UE Aquacell DSG C/D/I  Pulses: +2 intact to RUE  Sensation: decreased sensation to first and second digit, reports numbness to RUE, improving since surgery, patient had block  Motor: 5/5 R hand , U, PIN AIN nerves intact                          8.5    7.16  )-----------( 246      ( 17 Dec 2020 09:59 )             28.5   17 Dec 2020 09:58    133    |  95     |  9      ----------------------------<  104    4.1     |  23     |  0.63     Ca    9.2        17 Dec 2020 09:58        Plan of Care:  A/P: 45yFemale s/p partial revision TSR POD#1  - Stable  - Pain Control- Oxycodone 5-10mg PO Q4h prn moderate to severe pain, tylenol 650mg PO Q6h prn mild pain, toradol 15mg IVP q6h x3 doses, Dilaudid 0.5mg Q4h IVP prn breakthrough pain  - Nausea- x1 reglan dose given, scopolamine patch started  - DVT ppx: aspirin 81mg PO daily  - PT, WBS: non weight bearing to RUE  - IS use, oob to chair for meals as tolerated  - DISPO- home     Ortho Pager [phone]
Interval Events: Reviewed  Patient seen and examined at bedside.    Patient is a 45y old  Female who presents with a chief complaint of right shoulder pain (17 Dec 2020 15:12)  pain in both knees    PAST MEDICAL & SURGICAL HISTORY:  ETOH abuse  Pt denies    Anxiety and depression    Large bowel obstruction  2014, post op    H/O fall  6/14/19    Ankle fracture  left, 2016    Seizures    Panic attacks    Rosacea    H/O total shoulder replacement, right    Elective surgery  screws removed from right shoulder oct 2020    Elective surgery  laparoscopy adhesions removed- sept 2020    History of open reduction and internal fixation (ORIF) procedure  left ankle    H/O exploratory laparotomy    History of rhinoplasty    History of tonsillectomy    H/O total hysterectomy        MEDICATIONS:  Pulmonary:    Antimicrobials:    Anticoagulants:  aspirin enteric coated 81 milliGRAM(s) Oral daily    Cardiac:      Allergies    No Known Allergies    Intolerances        Vital Signs Last 24 Hrs  T(C): 36.8 (17 Dec 2020 21:27), Max: 36.8 (17 Dec 2020 21:27)  T(F): 98.2 (17 Dec 2020 21:27), Max: 98.2 (17 Dec 2020 21:27)  HR: 108 (17 Dec 2020 21:27) (90 - 115)  BP: 122/65 (17 Dec 2020 21:27) (102/71 - 129/82)  BP(mean): --  RR: 16 (17 Dec 2020 21:27) (12 - 18)  SpO2: 97% (17 Dec 2020 21:27) (96% - 100%)    12-16 @ 07:01  -  12-17 @ 07:00  --------------------------------------------------------  IN: 0 mL / OUT: 250 mL / NET: -250 mL    12-17 @ 07:01  -  12-17 @ 23:08  --------------------------------------------------------  IN: 240 mL / OUT: 250 mL / NET: -10 mL          Review of Systems:   •	General: negative  •	Skin/Breast: negative  •	Ophthalmologic: negative  •	ENMT: negative  •	Respiratory and Thorax: negative  •	Cardiovascular: negative  •	Gastrointestinal: negative  •	Genitourinary: negative  •	Musculoskeletal: negative  •	Neurological: negative  •	Psychiatric: negative  •	Hematology/Lymphatics: negative  •	Endocrine: negative  •	Allergic/Immunologic: negative    Physical Exam:   • Constitutional:	Well-developed, well nourished  • Eyes:	EOMI; PERRL; no drainage or redness  • ENMT:	No oral lesions; no gross abnormalities  • Neck	No bruits; no thyromegaly or nodules  • Breasts:	not examined  • Back:	No deformity or limitation of movement  • Respiratory:	Breath Sounds equal & clear to percussion & auscultation, no accessory muscle use  • Cardiovascular:	Regular rate & rhythm, normal S1, S2; no murmurs, gallops or rubs; no S3, S4  • Gastrointestinal:	Soft, non-tender, no hepatosplenomegaly, normal bowel sounds  • Genitourinary:	not examined  • Rectal: not examined  • Extremities:	No cyanosis, clubbing or edema  • Vascular:	Equal and normal pulses (carotid, femoral, dorsalis pedis)  • Neurologica:l	not examined  • Skin:	No lesions; no rash  • Lymph Nodes:	No lymphadedenopathy  • Musculoskeletal:	No joint pain, swelling or deformity; no limitation of movement        LABS:      CBC Full  -  ( 17 Dec 2020 09:59 )  WBC Count : 7.16 K/uL  RBC Count : 3.43 M/uL  Hemoglobin : 8.5 g/dL  Hematocrit : 28.5 %  Platelet Count - Automated : 246 K/uL  Mean Cell Volume : 83.1 fl  Mean Cell Hemoglobin : 24.8 pg  Mean Cell Hemoglobin Concentration : 29.8 gm/dL  Auto Neutrophil # : 5.51 K/uL  Auto Lymphocyte # : 0.76 K/uL  Auto Monocyte # : 0.84 K/uL  Auto Eosinophil # : 0.01 K/uL  Auto Basophil # : 0.01 K/uL  Auto Neutrophil % : 77.1 %  Auto Lymphocyte % : 10.6 %  Auto Monocyte % : 11.7 %  Auto Eosinophil % : 0.1 %  Auto Basophil % : 0.1 %    12-17    133<L>  |  95<L>  |  9   ----------------------------<  104<H>  4.1   |  23  |  0.63    Ca    9.2      17 Dec 2020 09:58                          RADIOLOGY & ADDITIONAL STUDIES (The following images were personally reviewed):  [place]:                                     No  Urine output:                       adequate  DVT prophylaxis:                 Yes  Flattus:                                  Yes  Bowel movement:              No  
Ortho Note    Pt comfortable without complaints, pain controlled  Denies CP, SOB, N/V, numbness/tingling     Vital Signs Last 24 Hrs  T(C): 37.3 (12-18-20 @ 05:57), Max: 37.3 (12-18-20 @ 05:57)  T(F): 99.2 (12-18-20 @ 05:57), Max: 99.2 (12-18-20 @ 05:57)  HR: 104 (12-18-20 @ 05:57) (104 - 104)  BP: 108/69 (12-18-20 @ 05:57) (108/69 - 108/69)  BP(mean): --  RR: 16 (12-18-20 @ 05:57) (16 - 16)  SpO2: 95% (12-18-20 @ 05:57) (95% - 95%)    General: Pt Alert and oriented, NAD  R shoulder aquacell DSG C/D/I  Sensation intact C5-T1  Axillary/AIN/PIN/U grossly intact  2+ rad pulse   Fingers WWP                          7.2    6.40  )-----------( 199      ( 18 Dec 2020 07:00 )             23.3   18 Dec 2020 07:00    x      |  92     |  x      ----------------------------<  71     4.0     |  22     |  0.61     Ca    8.5        18 Dec 2020 07:00    TPro  5.5    /  Alb  3.0    /  TBili  0.3    /  DBili  x      /  AST  11     /  ALT  <5     /  AlkPhos  61     18 Dec 2020 07:00      A/P: 45yFemale s/p R shoulder david 12/16  - Stable  - Pain Control  - DVT ppx: SCDs  - PT, WBS: NWB RUE in sling   - Dispo home    Ortho Pager [phone]
Ortho Post Op Check    Procedure: R shoulder david  Surgeon: Fadi     Pt comfortable without complaints, pain controlled  Denies CP, SOB, N/V, numbness/tingling     Vital Signs Last 24 Hrs  T(C): 36.8 (12-16-20 @ 20:10), Max: 36.8 (12-16-20 @ 20:10)  T(F): 98.2 (12-16-20 @ 20:10), Max: 98.2 (12-16-20 @ 20:10)  HR: 98 (12-16-20 @ 20:10) (98 - 100)  BP: 121/76 (12-16-20 @ 20:10) (102/69 - 121/76)  BP(mean): --  RR: 19 (12-16-20 @ 20:10) (19 - 22)  SpO2: 97% (12-16-20 @ 20:10) (97% - 99%)  AVSS    General: Pt Alert and oriented, NAD  R shoulder aquacell DSG C/D/I  Sensation unable to assess 2/2 block  Motor strength unable to assess 2/2 block  2+ rad pulse   Fingers WWP      Post-op X-Ray: hardware intact    A/P: 45yFemale POD#0 s/p R shoulder david 12/16  - Stable  - Pain Control  - DVT ppx: SCDs  - Post op abx: ancef periop  - PT, WBS: NWB RUE in sling   - Dispo home    Ortho Pager [phone]
Ortho Addendum    Discussed discharge and pain control with patient. Patient medically/ortho stable for discharge to home today. Per surgeon should be discharged home today. Patient states to date only Dilaudid IV has provided relief post op. Acknowledges the need to transition to oral medication and cannot get IV medication at home. Tramadol no significant relief. Has personal reservation/hesitation to use oxycodone at home, citing family member having had a bad experience/addiction issue. Patient concerned about her pain being controlled at home. Discussed a trial dose of PO Dilaudid in hospital. Patient more comfortable with plan of PO Dilaudid for home instead of Oxycodone given the positive relief IV Dilaudid provided. Patient agreeable to plan, instructed not to resume any prior Oxycodone may have left over from prior surgery, and caution with timing of taking Dilaudid and home Xanax.    Montez Nieves PA-C  
Ortho Note    Pt comfortable without complaints, pain controlled  Denies CP, SOB, N/V, numbness/tingling     Vital Signs Last 24 Hrs  T(C): 36.2 (12-17-20 @ 05:00), Max: 36.2 (12-17-20 @ 05:00)  T(F): 97.2 (12-17-20 @ 05:00), Max: 97.2 (12-17-20 @ 05:00)  HR: 90 (12-17-20 @ 05:00) (90 - 90)  BP: 102/71 (12-17-20 @ 05:00) (102/71 - 102/71)  BP(mean): --  RR: 17 (12-17-20 @ 05:00) (17 - 17)  SpO2: 99% (12-17-20 @ 05:00) (99% - 99%)  AVSS    General: Pt Alert and oriented, NAD  R shoulder aquacell DSG C/D/I  Sensation unable to assess 2/2 block  Motor strength unable to assess 2/2 block  2+ rad pulse   Fingers WWP      A/P: 45yFemale s/p R shoulder david 12/16  - Stable  - Pain Control  - DVT ppx: SCDs  - PT, WBS: NWB RUE in sling   - Dispo home    Ortho Pager [phone]
Ortho Note    Subjective:  Pt comfortable without complaints, pain controlled with current regimen. Patient Axox3, today. R UE in a sling.  Denies CP, SOB, N/V, numbness/tingling       Vital Signs Last 24 Hrs  T(C): 36.5 (12-18-20 @ 08:40), Max: 36.5 (12-18-20 @ 08:40)  T(F): 97.7 (12-18-20 @ 08:40), Max: 97.7 (12-18-20 @ 08:40)  HR: 94 (12-18-20 @ 08:40) (94 - 94)  BP: 107/69 (12-18-20 @ 08:40) (107/69 - 107/69)  BP(mean): --  RR: 17 (12-18-20 @ 08:40) (17 - 17)  SpO2: 100% (12-18-20 @ 08:40) (100% - 100%)  AVSS      Objective:    Physical Exam:  General: Pt Alert and oriented, NAD  Right shoulder DSG C/D/I in a sling  Pulses: Radial pulses intact, +2 to RUE  Sensation:  decreased sensation to first and second digit, reports numbness to RUE, improving since surgery   Motor: 5/5 R hand , U, PIN AIN nerves intact                          7.2    7.01  )-----------( 191      ( 18 Dec 2020 10:29 )             23.5   18 Dec 2020 10:29    124    |  93     |  6      ----------------------------<  76     4.5     |  20     |  0.60     Ca    8.6        18 Dec 2020 10:29    TPro  5.5    /  Alb  3.0    /  TBili  0.3    /  DBili  x      /  AST  11     /  ALT  <5     /  AlkPhos  61     18 Dec 2020 07:00      A/P: 45yFemale s/p partial revision TSR POD#2   - Axox3 today, monitor mental status  - , strict I/O, fluid restriction, u/a, u/a osmolality, urica acid, tsh, t4 sent  - hgb 7.2, v/s stable, patient asymptomatic denies h/a, dizziness   - repeat CBC bmp this afternoon, transfuse 1 unit prbc if decrease in hgb  - appreciate medicine recs  - Pain Control- Oxycodone 5-10mg PO Q4h prn moderate to severe pain, tylenol 650mg PO Q6h prn mild pain, Dilaudid 0.5mg Q4h IVP prn breakthrough pain  - DVT ppx: aspirin 81mg PO daily  - PT, WBS: non weight bearing to RUE  - IS use, oob to chair for meals as tolerated  - DISPO- home tomorrow with improvement of NA and HGB      Ortho Pager [phone]
Interval Events: Reviewed  Patient seen and examined at bedside.    Patient is a 45y old  Female who presents with a chief complaint of right shoulder pain (18 Dec 2020 11:24)    she is having pain  PAST MEDICAL & SURGICAL HISTORY:  ETOH abuse  Pt denies    Anxiety and depression    Large bowel obstruction  , post op    H/O fall  19    Ankle fracture  left, 2016    Seizures    Panic attacks    Rosacea    H/O total shoulder replacement, right    Elective surgery  screws removed from right shoulder oct 2020    Elective surgery  laparoscopy adhesions removed- 2020    History of open reduction and internal fixation (ORIF) procedure  left ankle    H/O exploratory laparotomy    History of rhinoplasty    History of tonsillectomy    H/O total hysterectomy        MEDICATIONS:  Pulmonary:    Antimicrobials:    Anticoagulants:  aspirin enteric coated 81 milliGRAM(s) Oral daily    Cardiac:      Allergies    No Known Allergies    Intolerances        Vital Signs Last 24 Hrs  T(C): 36.4 (18 Dec 2020 15:37), Max: 37.3 (18 Dec 2020 05:57)  T(F): 97.5 (18 Dec 2020 15:37), Max: 99.2 (18 Dec 2020 05:57)  HR: 106 (18 Dec 2020 15:37) (94 - 108)  BP: 104/70 (18 Dec 2020 15:37) (104/70 - 122/65)  BP(mean): --  RR: 18 (18 Dec 2020 15:37) (16 - 18)  SpO2: 99% (18 Dec 2020 15:37) (95% - 100%)    -17 @ 07:01  -  18 @ 07:00  --------------------------------------------------------  IN: 240 mL / OUT: 250 mL / NET: -10 mL          Review of Systems:   •	General: negative  •	Skin/Breast: negative  •	Ophthalmologic: negative  •	ENMT: negative  •	Respiratory and Thorax: negative  •	Cardiovascular: negative  •	Gastrointestinal: negative  •	Genitourinary: negative  •	Musculoskeletal: negative  •	Neurological: negative  •	Psychiatric: negative  •	Hematology/Lymphatics: negative  •	Endocrine: negative  •	Allergic/Immunologic: negative    Physical Exam:   • Constitutional:	Well-developed, well nourished  • Eyes:	EOMI; PERRL; no drainage or redness  • ENMT:	No oral lesions; no gross abnormalities  • Neck	No bruits; no thyromegaly or nodules  • Breasts:	not examined  • Back:	No deformity or limitation of movement  • Respiratory:	Breath Sounds equal & clear to percussion & auscultation, no accessory muscle use  • Cardiovascular:	Regular rate & rhythm, normal S1, S2; no murmurs, gallops or rubs; no S3, S4  • Gastrointestinal:	Soft, non-tender, no hepatosplenomegaly, normal bowel sounds  • Genitourinary:	not examined  • Rectal: not examined  • Extremities:	No cyanosis, clubbing or edema  • Vascular:	Equal and normal pulses (carotid, femoral, dorsalis pedis)  • Neurologica:l	not examined  • Skin:	No lesions; no rash  • Lymph Nodes:	No lymphadedenopathy  • Musculoskeletal:	No joint pain, swelling or deformity; no limitation of movement        LABS:      CBC Full  -  ( 18 Dec 2020 16:45 )  WBC Count : 6.33 K/uL  RBC Count : 3.00 M/uL  Hemoglobin : 7.4 g/dL  Hematocrit : 23.6 %  Platelet Count - Automated : 199 K/uL  Mean Cell Volume : 78.7 fl  Mean Cell Hemoglobin : 24.7 pg  Mean Cell Hemoglobin Concentration : 31.4 gm/dL  Auto Neutrophil # : x  Auto Lymphocyte # : x  Auto Monocyte # : x  Auto Eosinophil # : x  Auto Basophil # : x  Auto Neutrophil % : x  Auto Lymphocyte % : x  Auto Monocyte % : x  Auto Eosinophil % : x  Auto Basophil % : x    12-18    129<L>  |  95<L>  |  6<L>  ----------------------------<  75  3.5   |  20<L>  |  0.68    Ca    8.6      18 Dec 2020 16:45    TPro  6.2  /  Alb  3.3  /  TBili  0.3  /  DBili  x   /  AST  15  /  ALT  <5<L>  /  AlkPhos  74  12-18          Urinalysis Basic - ( 18 Dec 2020 11:52 )    Color: Yellow / Appearance: Clear / S.010 / pH: x  Gluc: x / Ketone: 40 mg/dL  / Bili: Negative / Urobili: 0.2 E.U./dL   Blood: x / Protein: NEGATIVE mg/dL / Nitrite: NEGATIVE   Leuk Esterase: NEGATIVE / RBC: x / WBC x   Sq Epi: x / Non Sq Epi: x / Bacteria: x                  RADIOLOGY & ADDITIONAL STUDIES (The following images were personally reviewed):  [place]:                                     No  Urine output:                       adequate  DVT prophylaxis:                 Yes  Flattus:                                  Yes  Bowel movement:              No

## 2020-12-22 DIAGNOSIS — Z91.81 HISTORY OF FALLING: ICD-10-CM

## 2020-12-22 DIAGNOSIS — F41.9 ANXIETY DISORDER, UNSPECIFIED: ICD-10-CM

## 2020-12-22 DIAGNOSIS — Z90.710 ACQUIRED ABSENCE OF BOTH CERVIX AND UTERUS: ICD-10-CM

## 2020-12-22 DIAGNOSIS — Z79.82 LONG TERM (CURRENT) USE OF ASPIRIN: ICD-10-CM

## 2020-12-22 DIAGNOSIS — S46.011A STRAIN OF MUSCLE(S) AND TENDON(S) OF THE ROTATOR CUFF OF RIGHT SHOULDER, INITIAL ENCOUNTER: ICD-10-CM
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
## 2020-12-22 DIAGNOSIS — G40.909 EPILEPSY, UNSPECIFIED, NOT INTRACTABLE, WITHOUT STATUS EPILEPTICUS: ICD-10-CM
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
## 2020-12-22 DIAGNOSIS — T84.028A DISLOCATION OF OTHER INTERNAL JOINT PROSTHESIS, INITIAL ENCOUNTER: ICD-10-CM

## 2020-12-22 DIAGNOSIS — F32.9 MAJOR DEPRESSIVE DISORDER, SINGLE EPISODE, UNSPECIFIED: ICD-10-CM

## 2020-12-22 DIAGNOSIS — E87.1 HYPO-OSMOLALITY AND HYPONATREMIA: ICD-10-CM

## 2020-12-22 DIAGNOSIS — D62 ACUTE POSTHEMORRHAGIC ANEMIA: ICD-10-CM

## 2021-01-05 PROCEDURE — 82570 ASSAY OF URINE CREATININE: CPT

## 2021-01-05 PROCEDURE — 83935 ASSAY OF URINE OSMOLALITY: CPT

## 2021-01-05 PROCEDURE — C1776: CPT

## 2021-01-05 PROCEDURE — 81003 URINALYSIS AUTO W/O SCOPE: CPT

## 2021-01-05 PROCEDURE — 85027 COMPLETE CBC AUTOMATED: CPT

## 2021-01-05 PROCEDURE — 36415 COLL VENOUS BLD VENIPUNCTURE: CPT

## 2021-01-05 PROCEDURE — 97161 PT EVAL LOW COMPLEX 20 MIN: CPT

## 2021-01-05 PROCEDURE — 85025 COMPLETE CBC W/AUTO DIFF WBC: CPT

## 2021-01-05 PROCEDURE — 80053 COMPREHEN METABOLIC PANEL: CPT

## 2021-01-05 PROCEDURE — 84436 ASSAY OF TOTAL THYROXINE: CPT

## 2021-01-05 PROCEDURE — 73020 X-RAY EXAM OF SHOULDER: CPT

## 2021-01-05 PROCEDURE — 84550 ASSAY OF BLOOD/URIC ACID: CPT

## 2021-01-05 PROCEDURE — 84443 ASSAY THYROID STIM HORMONE: CPT

## 2021-01-05 PROCEDURE — 80048 BASIC METABOLIC PNL TOTAL CA: CPT

## 2021-01-05 PROCEDURE — C1889: CPT

## 2021-01-05 PROCEDURE — C1713: CPT

## 2021-01-05 PROCEDURE — 84560 ASSAY OF URINE/URIC ACID: CPT

## 2021-01-05 PROCEDURE — 84300 ASSAY OF URINE SODIUM: CPT

## 2021-01-31 NOTE — ASU PATIENT PROFILE, ADULT - PT NEEDS ASSIST
The patient/caregiver verbalized understanding of how to care for the injured extremity with splint. no

## 2021-04-28 NOTE — PATIENT PROFILE ADULT - NSASFALLNEEDSASSIST_GEN_A_NUR
What Type Of Note Output Would You Prefer (Optional)?: Standard Output Hpi Title: Evaluation of Skin Lesions How Severe Are Your Spot(S)?: mild Have Your Spot(S) Been Treated In The Past?: has not been treated no

## 2021-10-01 PROBLEM — Z00.00 ENCOUNTER FOR PREVENTIVE HEALTH EXAMINATION: Status: ACTIVE | Noted: 2021-10-01

## 2021-10-04 ENCOUNTER — APPOINTMENT (OUTPATIENT)
Dept: NEUROLOGY | Facility: CLINIC | Age: 46
End: 2021-10-04

## 2021-10-06 ENCOUNTER — EMERGENCY (EMERGENCY)
Facility: HOSPITAL | Age: 46
LOS: 1 days | Discharge: HOME | End: 2021-10-06
Attending: EMERGENCY MEDICINE | Admitting: EMERGENCY MEDICINE
Payer: MEDICARE

## 2021-10-06 VITALS
SYSTOLIC BLOOD PRESSURE: 130 MMHG | TEMPERATURE: 98 F | HEART RATE: 80 BPM | RESPIRATION RATE: 16 BRPM | OXYGEN SATURATION: 97 % | DIASTOLIC BLOOD PRESSURE: 78 MMHG

## 2021-10-06 VITALS
RESPIRATION RATE: 16 BRPM | HEART RATE: 84 BPM | OXYGEN SATURATION: 97 % | TEMPERATURE: 98 F | SYSTOLIC BLOOD PRESSURE: 125 MMHG | DIASTOLIC BLOOD PRESSURE: 88 MMHG

## 2021-10-06 DIAGNOSIS — Z41.9 ENCOUNTER FOR PROCEDURE FOR PURPOSES OTHER THAN REMEDYING HEALTH STATE, UNSPECIFIED: Chronic | ICD-10-CM

## 2021-10-06 DIAGNOSIS — Z90.710 ACQUIRED ABSENCE OF BOTH CERVIX AND UTERUS: Chronic | ICD-10-CM

## 2021-10-06 DIAGNOSIS — Z98.890 OTHER SPECIFIED POSTPROCEDURAL STATES: Chronic | ICD-10-CM

## 2021-10-06 DIAGNOSIS — Z96.611 PRESENCE OF RIGHT ARTIFICIAL SHOULDER JOINT: Chronic | ICD-10-CM

## 2021-10-06 DIAGNOSIS — Z90.89 ACQUIRED ABSENCE OF OTHER ORGANS: Chronic | ICD-10-CM

## 2021-10-06 LAB
ALBUMIN SERPL ELPH-MCNC: 3.3 G/DL — SIGNIFICANT CHANGE UP (ref 3.3–5)
ALP SERPL-CCNC: 90 U/L — SIGNIFICANT CHANGE UP (ref 40–120)
ALT FLD-CCNC: 12 U/L — SIGNIFICANT CHANGE UP (ref 12–78)
ANION GAP SERPL CALC-SCNC: 7 MMOL/L — SIGNIFICANT CHANGE UP (ref 5–17)
AST SERPL-CCNC: 22 U/L — SIGNIFICANT CHANGE UP (ref 15–37)
BASOPHILS # BLD AUTO: 0.01 K/UL — SIGNIFICANT CHANGE UP (ref 0–0.2)
BASOPHILS NFR BLD AUTO: 0.1 % — SIGNIFICANT CHANGE UP (ref 0–2)
BILIRUB SERPL-MCNC: 0.2 MG/DL — SIGNIFICANT CHANGE UP (ref 0.2–1.2)
BUN SERPL-MCNC: 9 MG/DL — SIGNIFICANT CHANGE UP (ref 7–23)
C DIFF BY PCR RESULT: SIGNIFICANT CHANGE UP
C DIFF TOX GENS STL QL NAA+PROBE: SIGNIFICANT CHANGE UP
CALCIUM SERPL-MCNC: 8.5 MG/DL — SIGNIFICANT CHANGE UP (ref 8.5–10.1)
CHLORIDE SERPL-SCNC: 103 MMOL/L — SIGNIFICANT CHANGE UP (ref 96–108)
CO2 SERPL-SCNC: 27 MMOL/L — SIGNIFICANT CHANGE UP (ref 22–31)
CREAT SERPL-MCNC: 0.78 MG/DL — SIGNIFICANT CHANGE UP (ref 0.5–1.3)
EOSINOPHIL # BLD AUTO: 0.11 K/UL — SIGNIFICANT CHANGE UP (ref 0–0.5)
EOSINOPHIL NFR BLD AUTO: 1.5 % — SIGNIFICANT CHANGE UP (ref 0–6)
ETHANOL SERPL-MCNC: <10 MG/DL — SIGNIFICANT CHANGE UP (ref 0–10)
GLUCOSE SERPL-MCNC: 141 MG/DL — HIGH (ref 70–99)
HCG SERPL-ACNC: 2 MIU/ML — SIGNIFICANT CHANGE UP
HCT VFR BLD CALC: 40 % — SIGNIFICANT CHANGE UP (ref 34.5–45)
HGB BLD-MCNC: 13.7 G/DL — SIGNIFICANT CHANGE UP (ref 11.5–15.5)
IMM GRANULOCYTES NFR BLD AUTO: 0.5 % — SIGNIFICANT CHANGE UP (ref 0–1.5)
LIDOCAIN IGE QN: 83 U/L — SIGNIFICANT CHANGE UP (ref 73–393)
LYMPHOCYTES # BLD AUTO: 1.13 K/UL — SIGNIFICANT CHANGE UP (ref 1–3.3)
LYMPHOCYTES # BLD AUTO: 15.1 % — SIGNIFICANT CHANGE UP (ref 13–44)
MCHC RBC-ENTMCNC: 32.5 PG — SIGNIFICANT CHANGE UP (ref 27–34)
MCHC RBC-ENTMCNC: 34.3 GM/DL — SIGNIFICANT CHANGE UP (ref 32–36)
MCV RBC AUTO: 95 FL — SIGNIFICANT CHANGE UP (ref 80–100)
MONOCYTES # BLD AUTO: 0.49 K/UL — SIGNIFICANT CHANGE UP (ref 0–0.9)
MONOCYTES NFR BLD AUTO: 6.6 % — SIGNIFICANT CHANGE UP (ref 2–14)
NEUTROPHILS # BLD AUTO: 5.68 K/UL — SIGNIFICANT CHANGE UP (ref 1.8–7.4)
NEUTROPHILS NFR BLD AUTO: 76.2 % — SIGNIFICANT CHANGE UP (ref 43–77)
NRBC # BLD: 0 /100 WBCS — SIGNIFICANT CHANGE UP (ref 0–0)
PLATELET # BLD AUTO: 199 K/UL — SIGNIFICANT CHANGE UP (ref 150–400)
POTASSIUM SERPL-MCNC: 3.4 MMOL/L — LOW (ref 3.5–5.3)
POTASSIUM SERPL-SCNC: 3.4 MMOL/L — LOW (ref 3.5–5.3)
PROT SERPL-MCNC: 7.1 G/DL — SIGNIFICANT CHANGE UP (ref 6–8.3)
RBC # BLD: 4.21 M/UL — SIGNIFICANT CHANGE UP (ref 3.8–5.2)
RBC # FLD: 15.2 % — HIGH (ref 10.3–14.5)
SODIUM SERPL-SCNC: 137 MMOL/L — SIGNIFICANT CHANGE UP (ref 135–145)
WBC # BLD: 7.46 K/UL — SIGNIFICANT CHANGE UP (ref 3.8–10.5)
WBC # FLD AUTO: 7.46 K/UL — SIGNIFICANT CHANGE UP (ref 3.8–10.5)

## 2021-10-06 PROCEDURE — 99285 EMERGENCY DEPT VISIT HI MDM: CPT

## 2021-10-06 PROCEDURE — 83690 ASSAY OF LIPASE: CPT

## 2021-10-06 PROCEDURE — 96375 TX/PRO/DX INJ NEW DRUG ADDON: CPT

## 2021-10-06 PROCEDURE — 96374 THER/PROPH/DIAG INJ IV PUSH: CPT | Mod: XU

## 2021-10-06 PROCEDURE — 36415 COLL VENOUS BLD VENIPUNCTURE: CPT

## 2021-10-06 PROCEDURE — 99284 EMERGENCY DEPT VISIT MOD MDM: CPT | Mod: 25

## 2021-10-06 PROCEDURE — 74177 CT ABD & PELVIS W/CONTRAST: CPT | Mod: MA

## 2021-10-06 PROCEDURE — 84702 CHORIONIC GONADOTROPIN TEST: CPT

## 2021-10-06 PROCEDURE — 96361 HYDRATE IV INFUSION ADD-ON: CPT

## 2021-10-06 PROCEDURE — 74177 CT ABD & PELVIS W/CONTRAST: CPT | Mod: 26,MA

## 2021-10-06 PROCEDURE — 80053 COMPREHEN METABOLIC PANEL: CPT

## 2021-10-06 PROCEDURE — 87493 C DIFF AMPLIFIED PROBE: CPT

## 2021-10-06 PROCEDURE — 80307 DRUG TEST PRSMV CHEM ANLYZR: CPT

## 2021-10-06 PROCEDURE — 85025 COMPLETE CBC W/AUTO DIFF WBC: CPT

## 2021-10-06 RX ORDER — SODIUM CHLORIDE 9 MG/ML
1000 INJECTION INTRAMUSCULAR; INTRAVENOUS; SUBCUTANEOUS ONCE
Refills: 0 | Status: COMPLETED | OUTPATIENT
Start: 2021-10-06 | End: 2021-10-06

## 2021-10-06 RX ORDER — ONDANSETRON 8 MG/1
1 TABLET, FILM COATED ORAL
Qty: 21 | Refills: 0
Start: 2021-10-06 | End: 2021-10-12

## 2021-10-06 RX ORDER — PANTOPRAZOLE SODIUM 20 MG/1
40 TABLET, DELAYED RELEASE ORAL ONCE
Refills: 0 | Status: COMPLETED | OUTPATIENT
Start: 2021-10-06 | End: 2021-10-06

## 2021-10-06 RX ORDER — THIAMINE MONONITRATE (VIT B1) 100 MG
100 TABLET ORAL ONCE
Refills: 0 | Status: COMPLETED | OUTPATIENT
Start: 2021-10-06 | End: 2021-10-06

## 2021-10-06 RX ORDER — ONDANSETRON 8 MG/1
4 TABLET, FILM COATED ORAL ONCE
Refills: 0 | Status: COMPLETED | OUTPATIENT
Start: 2021-10-06 | End: 2021-10-06

## 2021-10-06 RX ADMIN — Medication 100 MILLIGRAM(S): at 10:59

## 2021-10-06 RX ADMIN — SODIUM CHLORIDE 1000 MILLILITER(S): 9 INJECTION INTRAMUSCULAR; INTRAVENOUS; SUBCUTANEOUS at 13:20

## 2021-10-06 RX ADMIN — PANTOPRAZOLE SODIUM 40 MILLIGRAM(S): 20 TABLET, DELAYED RELEASE ORAL at 10:57

## 2021-10-06 RX ADMIN — SODIUM CHLORIDE 1000 MILLILITER(S): 9 INJECTION INTRAMUSCULAR; INTRAVENOUS; SUBCUTANEOUS at 11:10

## 2021-10-06 RX ADMIN — ONDANSETRON 4 MILLIGRAM(S): 8 TABLET, FILM COATED ORAL at 10:43

## 2021-10-06 RX ADMIN — SODIUM CHLORIDE 1000 MILLILITER(S): 9 INJECTION INTRAMUSCULAR; INTRAVENOUS; SUBCUTANEOUS at 10:05

## 2021-10-06 RX ADMIN — SODIUM CHLORIDE 1000 MILLILITER(S): 9 INJECTION INTRAMUSCULAR; INTRAVENOUS; SUBCUTANEOUS at 14:30

## 2021-10-06 NOTE — ED PROVIDER NOTE - NSFOLLOWUPINSTRUCTIONS_ED_ALL_ED_FT
Follow up with GI  take antibiotics as directed   return to er for any worsening symptoms    Colitis    WHAT YOU NEED TO KNOW:    Colitis is swelling and irritation of your colon. Colitis may be caused by ulcers or a problem with your immune system. Bacteria, a virus, or a parasite may also cause colitis. The cause may not be known. You may have diarrhea, abdominal pain, fever, or blood or mucus in your bowel movement.    DISCHARGE INSTRUCTIONS:    Return to the emergency department if:   •You have sudden trouble breathing.      •Your bowel movements are black or have blood in them.      •You have blood in your vomit.      •You have severe abdominal pain or your abdomen is swollen and feels hard.      •You have any of the following signs of dehydration: ?Dizziness or weakness      ?Dry mouth, cracked lips, or severe thirst      ?Fast heartbeat or breathing      ?Urinating very little or not at all        Call your doctor if:   •Your symptoms get worse or do not go away.      •You have a fever, chills, cough, or feel weak and achy.      •You suddenly lose weight without trying.      •You have questions or concerns about your condition or care.      Medicines:   •Medicines may be given to decrease inflammation in your colon and treat diarrhea.      •Take your medicine as directed. Contact your healthcare provider if you think your medicine is not helping or if you have side effects. Tell him of her if you are allergic to any medicine. Keep a list of the medicines, vitamins, and herbs you take. Include the amounts, and when and why you take them. Bring the list or the pill bottles to follow-up visits. Carry your medicine list with you in case of an emergency.      Manage your symptoms:   •Drink liquids as directed to help prevent dehydration. Good liquids to drink include water, juice, and broth. Ask how much liquid to drink each day. You may need to drink an oral rehydration solution (ORS). An ORS contains a balance of water, salt, and sugar to replace body fluids lost during diarrhea.      •Eat a variety of healthy foods. Healthy foods include fruits, vegetables, whole-grain breads, beans, low-fat dairy products, lean meats, and fish. You may need to eat several small meals throughout the day instead of large meals. Avoid spicy foods, caffeine, chocolate, and foods high in fat.      •Talk to your healthcare provider before you take NSAIDs. NSAIDs can cause worsen your symptoms if ulcers are causing your colitis.      •Start to exercise when you feel better. Regular exercise helps your bowels work normally. Ask about the best exercise plan for you.      Prevent the spread of germs:          •Wash your hands often. Wash your hands several times each day. Wash after you use the bathroom, change a child's diaper, and before you prepare or eat food. Use soap and water every time. Rub your soapy hands together, lacing your fingers. Wash the front and back of your hands, and in between your fingers. Use the fingers of one hand to scrub under the fingernails of the other hand. Wash for at least 20 seconds. Rinse with warm, running water for several seconds. Then dry your hands with a clean towel or paper towel. Use hand  that contains alcohol if soap and water are not available. Do not touch your eyes, nose, or mouth without washing your hands first.  Handwashing           •Cover a sneeze or cough. Use a tissue that covers your mouth and nose. Throw the tissue away in a trash can right away. Use the bend of your arm if a tissue is not available. Wash your hands well with soap and water or use a hand .      •Clean surfaces often. Clean doorknobs, countertops, cell phones, and other surfaces that are touched often. Use a disinfecting wipe, a single-use sponge, or a cloth you can wash and reuse. Use disinfecting  if you do not have wipes. You can create a disinfecting  by mixing 1 part bleach with 10 parts water.      •Ask about vaccines you may need. Vaccines help prevent disease caused by some viruses and bacteria. Get the influenza (flu) vaccine as soon as recommended each year. The flu vaccine is usually available starting in September or October. Flu viruses change, so it is important to get a flu vaccine every year. Get the pneumonia vaccine if recommended. This vaccine is usually recommended every 5 years. Your provider will tell you when to get this vaccine, if needed. Your healthcare provider can tell you if you should get other vaccines, and when to get them.      Follow up with your doctor as directed: You may need to return for a colonoscopy or other tests. Write down how often you have a bowel movements and what they look like. Bring this to your follow-up visits. Write down your questions so you remember to ask them during your visits.

## 2021-10-06 NOTE — ED PROVIDER NOTE - ATTENDING CONTRIBUTION TO CARE
I have personally performed a face to face diagnostic evaluation on this patient.  I have reviewed the PA note and agree with the history, exam, and plan of care, except as noted.  History and Exam by me shows  abdominal pain, n/vd x2 days.  vomited x 3, watery diarrhea x 2-3.   pt is in nad.  pt has been doxy for her rosacea for years.  no recent hospitalization or sick contact or travel outside of [place].  abd- soft, mild diffuse tenderness, no guarding or rebound or distension.

## 2021-10-06 NOTE — ED PROVIDER NOTE - OBJECTIVE STATEMENT
Pt is a 45 yo female with pmhx of seizures BIBEMS for abdominal pain vomiting and diarrhea for 2 days. Pt mother called 911 and states pt has been drinking alcohol. Pt states last drink was 2 days ago and does not drink daily. Pt has hx of partial hysterectomy. Pt denies any fever chills chest pain sob urinary symptoms. Pt denies any drugs. Pt is a 47 yo female with pmhx of seizures alcohol abuse anxiety depression BIBEMS for abdominal pain vomiting and diarrhea for 2 days. Pt mother called 911 and states pt has been drinking alcohol. Pt states last drink was 2 days ago and does not drink daily. Pt has hx of partial hysterectomy. Pt denies any fever chills chest pain sob urinary symptoms. Pt denies any drugs.

## 2021-10-06 NOTE — ED PROVIDER NOTE - CONSTITUTIONAL, MLM
normal... Well appearing, awake, alert, oriented to person, place, time/situation and in no apparent distress. drowsy appearing

## 2021-10-06 NOTE — ED ADULT NURSE NOTE - TEMPLATE
Patient is a 58y old  Male who presents with a chief complaint of mouth lesions, possible stroke (05 Mar 2020 12:22)      SUBJECTIVE / OVERNIGHT EVENTS: no new events    MEDICATIONS  (STANDING):  aspirin  chewable 81 milliGRAM(s) Oral daily  atorvastatin 80 milliGRAM(s) Oral at bedtime  clopidogrel Tablet 75 milliGRAM(s) Oral daily  cyanocobalamin 1000 MICROGram(s) Oral daily  dextrose 5% + sodium chloride 0.45%. 1000 milliLiter(s) (70 mL/Hr) IV Continuous <Continuous>  dextrose 5%. 1000 milliLiter(s) (50 mL/Hr) IV Continuous <Continuous>  dextrose 50% Injectable 12.5 Gram(s) IV Push once  dextrose 50% Injectable 25 Gram(s) IV Push once  dextrose 50% Injectable 25 Gram(s) IV Push once  enoxaparin Injectable 40 milliGRAM(s) SubCutaneous daily  ergocalciferol Drops 2000 Unit(s) Enteral Tube daily  multivitamin/minerals/iron Oral Solution (CENTRUM) 15 milliLiter(s) Enteral Tube daily    MEDICATIONS  (PRN):  bisacodyl Suppository 10 milliGRAM(s) Rectal daily PRN Constipation  dextrose 40% Gel 15 Gram(s) Oral once PRN Blood Glucose LESS THAN 70 milliGRAM(s)/deciliter  famotidine Injectable 20 milliGRAM(s) IV Push every 12 hours PRN Dyspepsia  glucagon  Injectable 1 milliGRAM(s) IntraMuscular once PRN Glucose LESS THAN 70 milligrams/deciliter  morphine  - Injectable 2 milliGRAM(s) IV Push every 4 hours PRN Severe Pain (7 - 10)  ondansetron Injectable 4 milliGRAM(s) IV Push every 6 hours PRN Nausea      Vital Signs Last 24 Hrs  T(F): 98 (03-05-20 @ 11:33), Max: 98.2 (03-05-20 @ 05:18)  HR: 52 (03-05-20 @ 17:03) (45 - 52)  BP: 110/64 (03-05-20 @ 17:03) (104/69 - 118/73)  RR: 18 (03-05-20 @ 11:33) (18 - 18)  SpO2: 98% (03-05-20 @ 17:03) (96% - 99%)  Telemetry:   CAPILLARY BLOOD GLUCOSE      POCT Blood Glucose.: 141 mg/dL (05 Mar 2020 12:01)  POCT Blood Glucose.: 103 mg/dL (05 Mar 2020 06:02)  POCT Blood Glucose.: 116 mg/dL (05 Mar 2020 00:18)  POCT Blood Glucose.: 134 mg/dL (04 Mar 2020 21:50)    I&O's Summary    04 Mar 2020 07:01  -  05 Mar 2020 07:00  --------------------------------------------------------  IN: 2510 mL / OUT: 400 mL / NET: 2110 mL    05 Mar 2020 07:01  -  05 Mar 2020 17:38  --------------------------------------------------------  IN: 0 mL / OUT: 0 mL / NET: 0 mL        PHYSICAL EXAM:  GENERAL: NAD, well-developed  HEAD:  Atraumatic, Normocephalic  EYES: EOMI, PERRLA, conjunctiva and sclera clear  NECK: Supple, No JVD  CHEST/LUNG: Clear to auscultation bilaterally; No wheeze  HEART: Regular rate and rhythm; No murmurs, rubs, or gallops  ABDOMEN: Soft, Nontender, Nondistended; Bowel sounds present  EXTREMITIES:  2+ Peripheral Pulses, No clubbing, cyanosis, or edema  PSYCH: AAOx3  NEUROLOGY: non-focal  SKIN: No rashes or lesions    LABS:                        11.4   7.57  )-----------( 333      ( 04 Mar 2020 05:33 )             34.9     03-04    141  |  108  |  <4<L>  ----------------------------<  124<H>  3.5   |  23  |  0.68    Ca    9.0      04 Mar 2020 05:32                RADIOLOGY & ADDITIONAL TESTS:    Imaging Personally Reviewed:    Consultant(s) Notes Reviewed:      Care Discussed with Consultants/Other Providers: Abdominal Pain, N/V/D

## 2021-10-06 NOTE — ED PROVIDER NOTE - CLINICAL SUMMARY MEDICAL DECISION MAKING FREE TEXT BOX
Pt is a 45 yo female with abdominal pain nvd will get labs alcohol level ct scan give fluids zofran protonix

## 2021-10-06 NOTE — ED PROVIDER NOTE - PATIENT PORTAL LINK FT
You can access the FollowMyHealth Patient Portal offered by [facility] by registering at the following website: [email or web address]. By joining [employer]’s FollowMyHealth portal, you will also be able to view your health information using other applications (apps) compatible with our system.

## 2021-10-06 NOTE — ED ADULT NURSE NOTE - NSIMPLEMENTINTERV_GEN_ALL_ED
Implemented All Fall Risk Interventions:  [place] to call system. Call bell, personal items and telephone within reach. Instruct patient to call for assistance. Room bathroom lighting operational. Non-slip footwear when patient is off stretcher. Physically safe environment: no spills, clutter or unnecessary equipment. Stretcher in lowest position, wheels locked, appropriate side rails in place. Provide visual cue, wrist band, yellow gown, etc. Monitor gait and stability. Monitor for mental status changes and reorient to person, place, and time. Review medications for side effects contributing to fall risk. Reinforce activity limits and safety measures with patient and family.

## 2021-10-06 NOTE — ED PROVIDER NOTE - CARE PROVIDER_API CALL
Ajay Russell ()  Internal Medicine  [address] [place] [address]  Phone: [phone]  Fax: [phone]  Follow Up Time:

## 2022-03-01 NOTE — PATIENT PROFILE ADULT - NSPROIMPLANTSMEDDEV_GEN_A_NUR
PROCEDURES:  Insertion, arterial line, percutaneous 01-Mar-2022 19:38:45  Grace Mancia   left ankle screws

## 2023-01-12 PROBLEM — R56.9 UNSPECIFIED CONVULSIONS: Chronic | Status: ACTIVE | Noted: 2021-10-06

## 2023-05-24 NOTE — ASU PREOP CHECKLIST - LOOSE TEETH
Take antibiotic as prescribed.     Take probiotic once per day 2 hours after antibiotic.      Tylenol and ibuprofen as needed.     Rest and hydrate     Return if symptoms fail to improve   
no

## 2023-06-01 ENCOUNTER — OUTPATIENT (OUTPATIENT)
Dept: OUTPATIENT SERVICES | Facility: HOSPITAL | Age: 48
LOS: 1 days | Discharge: HOME | End: 2023-06-01
Payer: MEDICARE

## 2023-06-01 VITALS
TEMPERATURE: 98 F | RESPIRATION RATE: 16 BRPM | DIASTOLIC BLOOD PRESSURE: 85 MMHG | HEIGHT: 67 IN | SYSTOLIC BLOOD PRESSURE: 127 MMHG | HEART RATE: 71 BPM | WEIGHT: 205.91 LBS | OXYGEN SATURATION: 100 %

## 2023-06-01 DIAGNOSIS — Z98.890 OTHER SPECIFIED POSTPROCEDURAL STATES: Chronic | ICD-10-CM

## 2023-06-01 DIAGNOSIS — K21.00 GASTRO-ESOPHAGEAL REFLUX DISEASE WITH ESOPHAGITIS, WITHOUT BLEEDING: ICD-10-CM

## 2023-06-01 DIAGNOSIS — Z41.9 ENCOUNTER FOR PROCEDURE FOR PURPOSES OTHER THAN REMEDYING HEALTH STATE, UNSPECIFIED: Chronic | ICD-10-CM

## 2023-06-01 DIAGNOSIS — Z96.611 PRESENCE OF RIGHT ARTIFICIAL SHOULDER JOINT: Chronic | ICD-10-CM

## 2023-06-01 DIAGNOSIS — Z90.710 ACQUIRED ABSENCE OF BOTH CERVIX AND UTERUS: Chronic | ICD-10-CM

## 2023-06-01 DIAGNOSIS — Z90.89 ACQUIRED ABSENCE OF OTHER ORGANS: Chronic | ICD-10-CM

## 2023-06-01 PROCEDURE — 88313 SPECIAL STAINS GROUP 2: CPT | Mod: 26

## 2023-06-01 PROCEDURE — 88312 SPECIAL STAINS GROUP 1: CPT

## 2023-06-01 PROCEDURE — 88313 SPECIAL STAINS GROUP 2: CPT

## 2023-06-01 PROCEDURE — 88305 TISSUE EXAM BY PATHOLOGIST: CPT | Mod: 26

## 2023-06-01 PROCEDURE — 88312 SPECIAL STAINS GROUP 1: CPT | Mod: 26

## 2023-06-01 PROCEDURE — 88305 TISSUE EXAM BY PATHOLOGIST: CPT

## 2023-06-01 RX ORDER — ASPIRIN/CALCIUM CARB/MAGNESIUM 324 MG
0 TABLET ORAL
Qty: 0 | Refills: 0 | DISCHARGE

## 2023-06-01 RX ORDER — DEXLANSOPRAZOLE 30 MG/1
1 CAPSULE, DELAYED RELEASE ORAL
Qty: 0 | Refills: 0 | DISCHARGE

## 2023-06-01 NOTE — ASU PATIENT PROFILE, ADULT - NSICDXPASTMEDICALHX_GEN_ALL_CORE_FT
PAST MEDICAL HISTORY:  Ankle fracture left, 2016    Anxiety and depression     ETOH abuse Pt denies    H/O fall 6/14/19    Large bowel obstruction 2014, post op    Panic attacks     Rosacea     Seizures     Seizures

## 2023-06-06 LAB — SURGICAL PATHOLOGY STUDY: SIGNIFICANT CHANGE UP

## 2023-06-07 DIAGNOSIS — K21.00 GASTRO-ESOPHAGEAL REFLUX DISEASE WITH ESOPHAGITIS, WITHOUT BLEEDING: ICD-10-CM

## 2023-06-07 DIAGNOSIS — E66.9 OBESITY, UNSPECIFIED: ICD-10-CM

## 2023-06-07 DIAGNOSIS — F41.9 ANXIETY DISORDER, UNSPECIFIED: ICD-10-CM

## 2023-06-07 DIAGNOSIS — K44.9 DIAPHRAGMATIC HERNIA WITHOUT OBSTRUCTION OR GANGRENE: ICD-10-CM

## 2023-06-07 DIAGNOSIS — Z79.82 LONG TERM (CURRENT) USE OF ASPIRIN: ICD-10-CM

## 2023-06-07 DIAGNOSIS — K29.50 UNSPECIFIED CHRONIC GASTRITIS WITHOUT BLEEDING: ICD-10-CM

## 2023-06-07 DIAGNOSIS — F32.A DEPRESSION, UNSPECIFIED: ICD-10-CM

## 2023-09-08 ENCOUNTER — INPATIENT (INPATIENT)
Facility: HOSPITAL | Age: 48
LOS: 1 days | Discharge: HOME | DRG: 101 | End: 2023-09-10
Attending: STUDENT IN AN ORGANIZED HEALTH CARE EDUCATION/TRAINING PROGRAM | Admitting: STUDENT IN AN ORGANIZED HEALTH CARE EDUCATION/TRAINING PROGRAM
Payer: MEDICARE

## 2023-09-08 VITALS
DIASTOLIC BLOOD PRESSURE: 82 MMHG | TEMPERATURE: 98 F | OXYGEN SATURATION: 96 % | RESPIRATION RATE: 16 BRPM | WEIGHT: 169.98 LBS | HEIGHT: 67 IN | SYSTOLIC BLOOD PRESSURE: 110 MMHG | HEART RATE: 101 BPM

## 2023-09-08 DIAGNOSIS — Z41.9 ENCOUNTER FOR PROCEDURE FOR PURPOSES OTHER THAN REMEDYING HEALTH STATE, UNSPECIFIED: Chronic | ICD-10-CM

## 2023-09-08 DIAGNOSIS — K21.9 GASTRO-ESOPHAGEAL REFLUX DISEASE WITHOUT ESOPHAGITIS: ICD-10-CM

## 2023-09-08 DIAGNOSIS — Z90.89 ACQUIRED ABSENCE OF OTHER ORGANS: Chronic | ICD-10-CM

## 2023-09-08 DIAGNOSIS — Z29.9 ENCOUNTER FOR PROPHYLACTIC MEASURES, UNSPECIFIED: ICD-10-CM

## 2023-09-08 DIAGNOSIS — R56.9 UNSPECIFIED CONVULSIONS: ICD-10-CM

## 2023-09-08 DIAGNOSIS — Z96.611 PRESENCE OF RIGHT ARTIFICIAL SHOULDER JOINT: Chronic | ICD-10-CM

## 2023-09-08 DIAGNOSIS — R11.2 NAUSEA WITH VOMITING, UNSPECIFIED: ICD-10-CM

## 2023-09-08 DIAGNOSIS — Z98.890 OTHER SPECIFIED POSTPROCEDURAL STATES: Chronic | ICD-10-CM

## 2023-09-08 DIAGNOSIS — Z90.710 ACQUIRED ABSENCE OF BOTH CERVIX AND UTERUS: Chronic | ICD-10-CM

## 2023-09-08 DIAGNOSIS — G40.919 EPILEPSY, UNSPECIFIED, INTRACTABLE, WITHOUT STATUS EPILEPTICUS: ICD-10-CM

## 2023-09-08 DIAGNOSIS — F41.9 ANXIETY DISORDER, UNSPECIFIED: ICD-10-CM

## 2023-09-08 LAB
ALBUMIN SERPL ELPH-MCNC: 3.7 G/DL — SIGNIFICANT CHANGE UP (ref 3.3–5)
ALP SERPL-CCNC: 84 U/L — SIGNIFICANT CHANGE UP (ref 40–120)
ALT FLD-CCNC: 13 U/L — SIGNIFICANT CHANGE UP (ref 12–78)
ANION GAP SERPL CALC-SCNC: 10 MMOL/L — SIGNIFICANT CHANGE UP (ref 5–17)
APPEARANCE UR: CLEAR — SIGNIFICANT CHANGE UP
AST SERPL-CCNC: 26 U/L — SIGNIFICANT CHANGE UP (ref 15–37)
BACTERIA # UR AUTO: ABNORMAL /HPF
BASOPHILS # BLD AUTO: 0.03 K/UL — SIGNIFICANT CHANGE UP (ref 0–0.2)
BASOPHILS NFR BLD AUTO: 0.3 % — SIGNIFICANT CHANGE UP (ref 0–2)
BILIRUB SERPL-MCNC: 0.2 MG/DL — SIGNIFICANT CHANGE UP (ref 0.2–1.2)
BILIRUB UR-MCNC: NEGATIVE — SIGNIFICANT CHANGE UP
BUN SERPL-MCNC: 13 MG/DL — SIGNIFICANT CHANGE UP (ref 7–23)
CALCIUM SERPL-MCNC: 9.5 MG/DL — SIGNIFICANT CHANGE UP (ref 8.5–10.1)
CHLORIDE SERPL-SCNC: 105 MMOL/L — SIGNIFICANT CHANGE UP (ref 96–108)
CO2 SERPL-SCNC: 23 MMOL/L — SIGNIFICANT CHANGE UP (ref 22–31)
COLOR SPEC: YELLOW — SIGNIFICANT CHANGE UP
CREAT SERPL-MCNC: 1.1 MG/DL — SIGNIFICANT CHANGE UP (ref 0.5–1.3)
DIFF PNL FLD: NEGATIVE — SIGNIFICANT CHANGE UP
EGFR: 62 ML/MIN/1.73M2 — SIGNIFICANT CHANGE UP
EOSINOPHIL # BLD AUTO: 0.01 K/UL — SIGNIFICANT CHANGE UP (ref 0–0.5)
EOSINOPHIL NFR BLD AUTO: 0.1 % — SIGNIFICANT CHANGE UP (ref 0–6)
EPI CELLS # UR: PRESENT
GLUCOSE SERPL-MCNC: 104 MG/DL — HIGH (ref 70–99)
GLUCOSE UR QL: NEGATIVE MG/DL — SIGNIFICANT CHANGE UP
HCG SERPL-ACNC: <1 MIU/ML — SIGNIFICANT CHANGE UP
HCT VFR BLD CALC: 32.4 % — LOW (ref 34.5–45)
HGB BLD-MCNC: 10.1 G/DL — LOW (ref 11.5–15.5)
IMM GRANULOCYTES NFR BLD AUTO: 0.4 % — SIGNIFICANT CHANGE UP (ref 0–0.9)
KETONES UR-MCNC: 80 MG/DL
LEUKOCYTE ESTERASE UR-ACNC: NEGATIVE — SIGNIFICANT CHANGE UP
LIDOCAIN IGE QN: 68 U/L — SIGNIFICANT CHANGE UP (ref 13–75)
LYMPHOCYTES # BLD AUTO: 1.15 K/UL — SIGNIFICANT CHANGE UP (ref 1–3.3)
LYMPHOCYTES # BLD AUTO: 10.6 % — LOW (ref 13–44)
MAGNESIUM SERPL-MCNC: 2.3 MG/DL — SIGNIFICANT CHANGE UP (ref 1.6–2.6)
MCHC RBC-ENTMCNC: 26.2 PG — LOW (ref 27–34)
MCHC RBC-ENTMCNC: 31.2 GM/DL — LOW (ref 32–36)
MCV RBC AUTO: 83.9 FL — SIGNIFICANT CHANGE UP (ref 80–100)
MONOCYTES # BLD AUTO: 0.73 K/UL — SIGNIFICANT CHANGE UP (ref 0–0.9)
MONOCYTES NFR BLD AUTO: 6.8 % — SIGNIFICANT CHANGE UP (ref 2–14)
NEUTROPHILS # BLD AUTO: 8.84 K/UL — HIGH (ref 1.8–7.4)
NEUTROPHILS NFR BLD AUTO: 81.8 % — HIGH (ref 43–77)
NITRITE UR-MCNC: NEGATIVE — SIGNIFICANT CHANGE UP
NRBC # BLD: 0 /100 WBCS — SIGNIFICANT CHANGE UP (ref 0–0)
PH UR: 6 — SIGNIFICANT CHANGE UP (ref 5–8)
PHOSPHATE SERPL-MCNC: 2.2 MG/DL — LOW (ref 2.5–4.5)
PLATELET # BLD AUTO: 539 K/UL — HIGH (ref 150–400)
POTASSIUM SERPL-MCNC: 3.4 MMOL/L — LOW (ref 3.5–5.3)
POTASSIUM SERPL-SCNC: 3.4 MMOL/L — LOW (ref 3.5–5.3)
PROT SERPL-MCNC: 8.1 G/DL — SIGNIFICANT CHANGE UP (ref 6–8.3)
PROT UR-MCNC: 30 MG/DL
RBC # BLD: 3.86 M/UL — SIGNIFICANT CHANGE UP (ref 3.8–5.2)
RBC # FLD: 15.9 % — HIGH (ref 10.3–14.5)
RBC CASTS # UR COMP ASSIST: 0 /HPF — SIGNIFICANT CHANGE UP (ref 0–4)
SARS-COV-2 RNA SPEC QL NAA+PROBE: SIGNIFICANT CHANGE UP
SODIUM SERPL-SCNC: 138 MMOL/L — SIGNIFICANT CHANGE UP (ref 135–145)
SP GR SPEC: 1.05 — HIGH (ref 1–1.03)
UROBILINOGEN FLD QL: 1 MG/DL — SIGNIFICANT CHANGE UP (ref 0.2–1)
WBC # BLD: 10.8 K/UL — HIGH (ref 3.8–10.5)
WBC # FLD AUTO: 10.8 K/UL — HIGH (ref 3.8–10.5)
WBC UR QL: 0 /HPF — SIGNIFICANT CHANGE UP (ref 0–5)

## 2023-09-08 PROCEDURE — 99223 1ST HOSP IP/OBS HIGH 75: CPT | Mod: GC

## 2023-09-08 PROCEDURE — 93010 ELECTROCARDIOGRAM REPORT: CPT

## 2023-09-08 PROCEDURE — 99285 EMERGENCY DEPT VISIT HI MDM: CPT

## 2023-09-08 PROCEDURE — 74177 CT ABD & PELVIS W/CONTRAST: CPT | Mod: 26,MA

## 2023-09-08 PROCEDURE — 70450 CT HEAD/BRAIN W/O DYE: CPT | Mod: 26,MA

## 2023-09-08 RX ORDER — LANOLIN ALCOHOL/MO/W.PET/CERES
3 CREAM (GRAM) TOPICAL AT BEDTIME
Refills: 0 | Status: DISCONTINUED | OUTPATIENT
Start: 2023-09-08 | End: 2023-09-10

## 2023-09-08 RX ORDER — ALPRAZOLAM 0.25 MG
2 TABLET ORAL
Refills: 0 | Status: DISCONTINUED | OUTPATIENT
Start: 2023-09-08 | End: 2023-09-09

## 2023-09-08 RX ORDER — MELOXICAM 15 MG/1
1 TABLET ORAL
Refills: 0 | DISCHARGE

## 2023-09-08 RX ORDER — ESLICARBAZEPINE ACETATE 800 MG/1
0 TABLET ORAL
Qty: 0 | Refills: 0 | DISCHARGE

## 2023-09-08 RX ORDER — PANTOPRAZOLE SODIUM 20 MG/1
1 TABLET, DELAYED RELEASE ORAL
Refills: 0 | DISCHARGE

## 2023-09-08 RX ORDER — TRAMADOL HYDROCHLORIDE 50 MG/1
25 TABLET ORAL ONCE
Refills: 0 | Status: DISCONTINUED | OUTPATIENT
Start: 2023-09-08 | End: 2023-09-09

## 2023-09-08 RX ORDER — ESLICARBAZEPINE ACETATE 800 MG/1
800 TABLET ORAL DAILY
Refills: 0 | Status: DISCONTINUED | OUTPATIENT
Start: 2023-09-08 | End: 2023-09-08

## 2023-09-08 RX ORDER — ACETAMINOPHEN 500 MG
1000 TABLET ORAL ONCE
Refills: 0 | Status: COMPLETED | OUTPATIENT
Start: 2023-09-08 | End: 2023-09-08

## 2023-09-08 RX ORDER — SODIUM CHLORIDE 9 MG/ML
1000 INJECTION INTRAMUSCULAR; INTRAVENOUS; SUBCUTANEOUS ONCE
Refills: 0 | Status: COMPLETED | OUTPATIENT
Start: 2023-09-08 | End: 2023-09-08

## 2023-09-08 RX ORDER — LACOSAMIDE 50 MG/1
0 TABLET ORAL
Qty: 0 | Refills: 0 | DISCHARGE

## 2023-09-08 RX ORDER — CITALOPRAM 10 MG/1
1 TABLET, FILM COATED ORAL
Refills: 0 | DISCHARGE

## 2023-09-08 RX ORDER — ACETAMINOPHEN 500 MG
650 TABLET ORAL EVERY 6 HOURS
Refills: 0 | Status: DISCONTINUED | OUTPATIENT
Start: 2023-09-08 | End: 2023-09-10

## 2023-09-08 RX ORDER — ONDANSETRON 8 MG/1
4 TABLET, FILM COATED ORAL ONCE
Refills: 0 | Status: COMPLETED | OUTPATIENT
Start: 2023-09-08 | End: 2023-09-08

## 2023-09-08 RX ORDER — MELOXICAM 15 MG/1
2 TABLET ORAL
Refills: 0 | DISCHARGE

## 2023-09-08 RX ORDER — ENOXAPARIN SODIUM 100 MG/ML
40 INJECTION SUBCUTANEOUS EVERY 24 HOURS
Refills: 0 | Status: DISCONTINUED | OUTPATIENT
Start: 2023-09-08 | End: 2023-09-10

## 2023-09-08 RX ORDER — CITALOPRAM 10 MG/1
20 TABLET, FILM COATED ORAL DAILY
Refills: 0 | Status: DISCONTINUED | OUTPATIENT
Start: 2023-09-08 | End: 2023-09-10

## 2023-09-08 RX ORDER — ALPRAZOLAM 0.25 MG
1 TABLET ORAL
Qty: 0 | Refills: 0 | DISCHARGE

## 2023-09-08 RX ORDER — LACOSAMIDE 50 MG/1
200 TABLET ORAL EVERY 12 HOURS
Refills: 0 | Status: DISCONTINUED | OUTPATIENT
Start: 2023-09-08 | End: 2023-09-09

## 2023-09-08 RX ORDER — ONDANSETRON 8 MG/1
4 TABLET, FILM COATED ORAL EVERY 8 HOURS
Refills: 0 | Status: DISCONTINUED | OUTPATIENT
Start: 2023-09-08 | End: 2023-09-10

## 2023-09-08 RX ORDER — KETOROLAC TROMETHAMINE 30 MG/ML
15 SYRINGE (ML) INJECTION ONCE
Refills: 0 | Status: DISCONTINUED | OUTPATIENT
Start: 2023-09-08 | End: 2023-09-08

## 2023-09-08 RX ORDER — ESLICARBAZEPINE ACETATE 800 MG/1
800 TABLET ORAL DAILY
Refills: 0 | Status: DISCONTINUED | OUTPATIENT
Start: 2023-09-08 | End: 2023-09-10

## 2023-09-08 RX ORDER — FAMOTIDINE 10 MG/ML
1 INJECTION INTRAVENOUS
Refills: 0 | DISCHARGE

## 2023-09-08 RX ORDER — PANTOPRAZOLE SODIUM 20 MG/1
40 TABLET, DELAYED RELEASE ORAL
Refills: 0 | Status: DISCONTINUED | OUTPATIENT
Start: 2023-09-08 | End: 2023-09-10

## 2023-09-08 RX ORDER — LACOSAMIDE 50 MG/1
200 TABLET ORAL ONCE
Refills: 0 | Status: DISCONTINUED | OUTPATIENT
Start: 2023-09-08 | End: 2023-09-08

## 2023-09-08 RX ADMIN — Medication 1000 MILLIGRAM(S): at 19:15

## 2023-09-08 RX ADMIN — Medication 15 MILLIGRAM(S): at 21:20

## 2023-09-08 RX ADMIN — SODIUM CHLORIDE 1000 MILLILITER(S): 9 INJECTION INTRAMUSCULAR; INTRAVENOUS; SUBCUTANEOUS at 16:34

## 2023-09-08 RX ADMIN — ONDANSETRON 4 MILLIGRAM(S): 8 TABLET, FILM COATED ORAL at 16:35

## 2023-09-08 RX ADMIN — LACOSAMIDE 200 MILLIGRAM(S): 50 TABLET ORAL at 19:15

## 2023-09-08 RX ADMIN — Medication 400 MILLIGRAM(S): at 16:34

## 2023-09-08 RX ADMIN — LACOSAMIDE 140 MILLIGRAM(S): 50 TABLET ORAL at 18:30

## 2023-09-08 NOTE — ED PROVIDER NOTE - OBJECTIVE STATEMENT
48 y/o F hx of seizure disorder, Anxiety/Depression presenting with concern for seizure activity     Patient states she awoke with headache this morning and had witnessed event of drooling in which she does not remember. Now feels nauseous, vomited in ER. Takes Vimpat 200 mg BID for seizures and 46 y/o F hx of seizure disorder, Anxiety/Depression presenting with concern for seizure activity     Patient states she awoke with headache this morning and had witnessed event of drooling and felt she bit her tongue in which she does not remember. Now feels nauseous, vomited in ER. Takes Vimpat 200 mg BID for seizures and Aptiom 300mg . Mother states she 'figs to [employer]' then had generalized convulsions for 2 minutes and she was postictal subsequently. She lives with her mother. Her neurologist Dr. Avalos @[facility]. She states that she has been vomiting since 9/5 and is unsure if she took her medications. Last week she [facility] and was discharged 8/31. Last seizure was over year ago.   PSH: Ovarian tumor removal, Hysterectomy. 48 y/o F hx of seizure disorder, Anxiety/Depression presenting with concern for seizure activity     Patient states she awoke with headache this morning and had witnessed event of drooling and felt she bit her tongue in which she does not remember. Now feels nauseous, vomited in ER. Takes Vimpat 200 mg BID for seizures and Aptiom 800mg QD. Mother states she 'figs to [employer]' then had generalized convulsions for 2 minutes and she was postictal subsequently. She lives with her mother. Her neurologist Dr. Avalos @[facility]. She states that she has been vomiting since 9/5 and is unsure if she took her medications. Last week she had shoulder operated on @ [facility] and was discharged 8/31. Last seizure was over year ago.   PSH: Ovarian tumor removal, Hysterectomy. 48 y/o F hx of seizure disorder, Anxiety/Depression presenting with concern for seizure activity     Patient states she awoke with headache this morning and had witnessed event of drooling and felt she bit her tongue in which she does not remember. Now feels nauseous, vomited in ER. Takes Vimpat 200 mg BID for seizures and Aptiom 800mg QD. Mother states she muttered 'figs to [employer]' then had generalized convulsions for 2 minutes and she was postictal subsequently. She lives with her mother. Her neurologist Dr. Avalos @[facility]. She states that she has been vomiting since 9/5 and is unsure if she took her medications. Last week she had shoulder operated on @ [facility] and was discharged 8/31. Last seizure was over year ago.   PSH: Ovarian tumor removal, Hysterectomy.    PCP: Dr. Wyatt Cedillo

## 2023-09-08 NOTE — ED PROVIDER NOTE - PROGRESS NOTE DETAILS
No obstruction on CT. Patient remains intermittently confused and not tolerating PO well. Will admit for IV AEDs. Dr. Brown informed of patient.

## 2023-09-08 NOTE — ED ADULT NURSE NOTE - NURSING MUSC ROM
The patient is a 51y Male complaining of abdominal pain.
yes
full range of motion in all extremities

## 2023-09-08 NOTE — H&P ADULT - NSHPPHYSICALEXAM_GEN_ALL_CORE
T(C): 36.6 (09-08-23 @ 21:25), Max: 36.6 (09-08-23 @ 21:25)  HR: 94 (09-08-23 @ 21:25) (94 - 101)  BP: 114/78 (09-08-23 @ 21:25) (110/82 - 114/78)  RR: 16 (09-08-23 @ 21:25) (16 - 16)  SpO2: 97% (09-08-23 @ 21:25) (96% - 97%)    GENERAL: patient appears well, no acute distress, appropriate, pleasant  EYES: sclera clear, no exudates  ENMT: oropharynx clear without erythema, no exudates, moist mucous membranes  NECK: supple, soft, no thyromegaly noted  LUNGS: good air entry bilaterally, clear to auscultation, symmetric breath sounds, no wheezing or rhonchi appreciated  HEART: S1/S2, regular rate and rhythm, no murmurs noted, no lower extremity edema  GASTROINTESTINAL: abdomen is soft, nontender, nondistended, normoactive bowel sounds, no palpable masses  INTEGUMENT: good skin turgor, no lesions noted  MUSCULOSKELETAL: no clubbing or cyanosis, no obvious deformity  NEUROLOGIC: awake, alert, oriented x3, CN II-XII grossly intact, good muscle tone in 4 extremities, no obvious sensory deficits  PSYCHIATRIC: mood is good, affect is congruent, linear and logical thought process  HEME/LYMPH: no palpable supraclavicular nodules, no obvious ecchymosis or petechiae T(C): 36.6 (09-08-23 @ 21:25), Max: 36.6 (09-08-23 @ 21:25)  HR: 94 (09-08-23 @ 21:25) (94 - 101)  BP: 114/78 (09-08-23 @ 21:25) (110/82 - 114/78)  RR: 16 (09-08-23 @ 21:25) (16 - 16)  SpO2: 97% (09-08-23 @ 21:25) (96% - 97%)    GENERAL: patient appears well, no acute distress, appropriate, pleasant  EYES: sclera clear, no exudates  ENMT: +small left sided tongue lac, oropharynx clear without erythema, no exudates, moist mucous membranes  NECK: supple, soft, no thyromegaly noted  LUNGS: good air entry bilaterally, clear to auscultation, symmetric breath sounds, no wheezing or rhonchi appreciated  HEART: S1/S2, regular rate and rhythm, no murmurs noted, no lower extremity edema  GASTROINTESTINAL: abdomen is soft, nontender, nondistended, normoactive bowel sounds, no palpable masses  INTEGUMENT: good skin turgor, no lesions noted  MUSCULOSKELETAL: no clubbing or cyanosis, no obvious deformity  NEUROLOGIC: awake, alert, oriented x3, CN II-XII grossly intact, good muscle tone in 4 extremities, no obvious sensory deficits  PSYCHIATRIC: mood is good, affect is congruent, linear and logical thought process  HEME/LYMPH: no palpable supraclavicular nodules, no obvious ecchymosis or petechiae T(C): 36.6 (09-08-23 @ 21:25), Max: 36.6 (09-08-23 @ 21:25)  HR: 94 (09-08-23 @ 21:25) (94 - 101)  BP: 114/78 (09-08-23 @ 21:25) (110/82 - 114/78)  RR: 16 (09-08-23 @ 21:25) (16 - 16)  SpO2: 97% (09-08-23 @ 21:25) (96% - 97%)  GENERAL: patient appears well, no acute distress, appropriate, pleasant  EYES: sclera clear, no exudates  ENMT: +small left sided tongue lac, oropharynx clear without erythema, no exudates, moist mucous membranes  NECK: supple, soft, no thyromegaly noted  LUNGS: good air entry bilaterally, clear to auscultation, symmetric breath sounds, no wheezing or rhonchi appreciated  HEART: S1/S2, regular rate and rhythm, no murmurs noted, no lower extremity edema  GASTROINTESTINAL: abdomen is soft, nontender, nondistended, normoactive bowel sounds, no palpable masses  INTEGUMENT: good skin turgor, no lesions noted  MUSCULOSKELETAL: no clubbing or cyanosis, no obvious deformity  NEUROLOGIC: awake, alert, oriented x3, CN II-XII grossly intact, good muscle tone in 4 extremities, no obvious sensory deficits  PSYCHIATRIC: mood is good, affect is congruent, linear and logical thought process  HEME/LYMPH: no palpable supraclavicular nodules, no obvious ecchymosis or petechiae

## 2023-09-08 NOTE — ED ADULT NURSE NOTE - NSFALLHARMRISKINTERV_ED_ALL_ED

## 2023-09-08 NOTE — ED PROVIDER NOTE - CLINICAL SUMMARY MEDICAL DECISION MAKING FREE TEXT BOX
46 y/o F hx of seizure disorder, Anxiety/Depression presenting with concern for seizure activity   Breakthrough seizure today witnessed by mother.   Has had multiple vomiting episodes for 3 days with history of SBO- r/o recurrent SBO  Check screening labs, UA, EKG  d/w Neurology regarding AED management for breakthrough seizures. 48 y/o F hx of seizure disorder, Anxiety/Depression presenting with concern for seizure activity   Breakthrough seizure today witnessed by mother vs. medication ineffectiveness due to persistent vomiting   Has had multiple vomiting episodes for 3 days with history of SBO- r/o recurrent SBO  Check screening labs, UA, EKG  d/w Neurology regarding AED management for 'breakthrough' seizures  Recommending normal IV dose of Vimpat (200 mg)

## 2023-09-08 NOTE — H&P ADULT - PROBLEM SELECTOR PROBLEM 1
VSS, all questions answered. Denies recent fever or illness. Pt states ready for procedure.  
Seizures

## 2023-09-08 NOTE — H&P ADULT - NSHPSOCIALHISTORY_GEN_ALL_CORE
denies tobacco  drinks alcohol occasionally  denies drug use    lives with mother  fully functional with adls

## 2023-09-08 NOTE — ED ADULT TRIAGE NOTE - CHIEF COMPLAINT QUOTE
witnessed seizure  today- on the couch- as per told to ems, patient did not fall, c/o headache- on seizure medications- vimpat, patient confused

## 2023-09-08 NOTE — H&P ADULT - NSHPREVIEWOFSYSTEMS_GEN_ALL_CORE
CONSTITUTIONAL: denies fever, chills, fatigue, weakness  HEENT: denies blurred vision, sore throat  SKIN: denies rash  CARDIOVASCULAR: denies chest pain, chest pressure, palpitations  RESPIRATORY: denies shortness of breath, sputum production  GASTROINTESTINAL: +nausea, +vomiting, denies diarrhea, abdominal pain  GENITOURINARY: denies dysuria  NEUROLOGICAL: +headache, denies numbness, focal weakness  MUSCULOSKELETAL: denies new joint pain, muscle aches  HEMATOLOGIC: denies gross bleeding  LYMPHATICS: denies enlarged lymph nodes, extremity swelling  PSYCHIATRIC: denies recent changes in anxiety, depression

## 2023-09-08 NOTE — ED PROVIDER NOTE - PHYSICAL EXAMINATION
GENERAL: uncomfortable appearing from vomiting.   HEAD:  Atraumatic, Normocephalic  EYES: EOMI, PERRLA,   ENT: MMM; oropharynx clear  NECK: Supple, No JVD  CHEST/LUNG: Clear to auscultation bilaterally; No wheeze  HEART: Regular rate and rhythm; No murmurs, rubs, or gallops  ABDOMEN: Soft, Nontender, Nondistended; Bowel sounds present  EXTREMITIES:  2+ Peripheral Pulses, No clubbing, cyanosis, or edema  NEUROLOGY: no focal motor or sensory deficits. 5/5 muscle strength in all extremities.   SKIN: No rashes or lesions GENERAL: uncomfortable appearing from vomiting.   HEAD:  Atraumatic, Normocephalic  EYES: EOMI, PERRLA,   ENT: MMM; oropharynx clear +tongue laceration non-bleeding.   NECK: Supple, No JVD  CHEST/LUNG: Clear to auscultation bilaterally; No wheeze  HEART: Regular rate and rhythm; No murmurs, rubs, or gallops  ABDOMEN: Soft, Nontender, Nondistended; Bowel sounds present  EXTREMITIES:  2+ Peripheral Pulses, No clubbing, cyanosis, or edema  NEUROLOGY: no focal motor or sensory deficits. 5/5 muscle strength in all extremities.   SKIN: No rashes or lesions

## 2023-09-08 NOTE — ED ADULT NURSE NOTE - OBJECTIVE STATEMENT
Pt received in bed alert and oriented and on seizure precaution with the c/o as per EMS and pt of witnessed seizure at home on the couch. As per Md's orders IV jason placed blood specimen obtained and sent to the lab. Meds given and tolerated well. Pt on seizure precaution. Nursing care ongoing and safety maintained

## 2023-09-08 NOTE — H&P ADULT - HISTORY OF PRESENT ILLNESS
ed:  vitals - bp 110/82, rr 16, hr 101, t97.6, spo2 96  labs - wbc 10.8, hgb 10.1, plt 539, k 3.4  imaging: CTAP: No bowel obstruction or grossly thickened bowel wall. Appendix within   normal limits. Small hiatal hernia, unchanged.    2.0 cm cystic structure in the left adnexa, likely representing a left   ovarian cyst.  CTH: No acute intracranial hemorrhage, vasogenic edema or extra-axial   collection. Ventriculomegaly.  EKG - nsr 72  meds given - toradol, vimpat, zofran, tylenol, bolus   48 y/o pmhx seizure disorder, anxiety/depression presents with seizure. Patient had a shoulder surgery done last week on her right shoulder at [facility] and was discharged 8/31 on oxycodone. Per mother she has been vomiting after taking this medication since 9/5 and believes Lizet has been vomiting up her ant-seizure meds. Patient states she feels she had a seizure last night, as she woke up with a tongue laceration. Per mother, daughter was saying abnormal things this morning and then had a ~2 minute long tonic-clonic seizure, after which she was brought to the ED. The patient admits to a slight headache, intermittent nausea and vomiting, but denies other symptoms such as chest pain, palpitations, shortness of breath, abdominal pain, fevers chills, diarrhea.    ed:  vitals - bp 110/82, rr 16, hr 101, t97.6, spo2 96  labs - wbc 10.8, hgb 10.1, plt 539, k 3.4  imaging: CTAP: No bowel obstruction or grossly thickened bowel wall. Appendix within   normal limits. Small hiatal hernia, unchanged.    2.0 cm cystic structure in the left adnexa, likely representing a left   ovarian cyst.  CTH: No acute intracranial hemorrhage, vasogenic edema or extra-axial   collection. Ventriculomegaly.  EKG - nsr 72  meds given - toradol, vimpat, zofran, tylenol, bolus   48 y/o pmhx seizure disorder, anxiety/depression presents with seizure. Patient had a shoulder surgery done last week on her right shoulder at [facility] and was discharged 8/31 on oxycodone. Per mother she has been vomiting after taking this medication since 9/5 and believes Lizet has been vomiting up her ant-seizure meds. Patient states she feels she had a seizure last night, as she woke up with a tongue laceration. Per mother, daughter was saying abnormal things this morning and then had a ~2 minute long tonic-clonic seizure, after which she was brought to the ED. The patient admits to a slight headache, intermittent nausea and vomiting, but denies other symptoms such as chest pain, palpitations, shortness of breath, abdominal pain, fevers chills, diarrhea. Patient states she has been hving seizures since ~age 38, last seizure ~2 years ago.    ed:  vitals - bp 110/82, rr 16, hr 101, t97.6, spo2 96  labs - wbc 10.8, hgb 10.1, plt 539, k 3.4  imaging: CTAP: No bowel obstruction or grossly thickened bowel wall. Appendix within   normal limits. Small hiatal hernia, unchanged.    2.0 cm cystic structure in the left adnexa, likely representing a left   ovarian cyst.  CTH: No acute intracranial hemorrhage, vasogenic edema or extra-axial   collection. Ventriculomegaly.  EKG - nsr 72  meds given - toradol, vimpat, zofran, tylenol, bolus   48 y/o pmhx seizure disorder, anxiety/depression presents with seizure. Patient had a shoulder surgery done last week on her right shoulder at [facility] and was discharged 8/31 on oxycodone. Per mother she has been vomiting after taking this medication since 9/5 and believes Lizet has been vomiting up her ant-seizure meds. Patient states she feels she had a seizure last night, as she woke up with a tongue laceration. Per mother, daughter was saying abnormal things this morning and then had a ~2 minute long tonic-clonic seizure, after which she was brought to the ED. The patient admits to a slight headache, intermittent nausea and vomiting, but denies other symptoms such as chest pain, palpitations, shortness of breath, abdominal pain, fevers chills, diarrhea. Patient states she has been having seizures since ~age 38, last seizure ~2 years ago.    ed:  vitals - bp 110/82, rr 16, hr 101, t97.6, spo2 96  labs - wbc 10.8, hgb 10.1, plt 539, k 3.4  imaging: CTAP: No bowel obstruction or grossly thickened bowel wall. Appendix within   normal limits. Small hiatal hernia, unchanged.    2.0 cm cystic structure in the left adnexa, likely representing a left   ovarian cyst.  CTH: No acute intracranial hemorrhage, vasogenic edema or extra-axial   collection. Ventriculomegaly.  EKG - nsr 72  meds given - toradol, vimpat, zofran, tylenol, bolus

## 2023-09-08 NOTE — H&P ADULT - ATTENDING COMMENTS
48 y/o pmhx seizure disorder, anxiety/depression admitted with seizures.    Agree with above. Edited where appropriate

## 2023-09-08 NOTE — H&P ADULT - PROBLEM SELECTOR PLAN 1
possible patient has been throwing up her anti-seizure meds; suspect that patient has been throwing up 2/2 oxycodone  CTH: No acute intracranial hemorrhage, vasogenic edema or extra-axial   collection. Ventriculomegaly.  - will give home vimpat through IV 200mg bid  - aptiom unavailable via IV, will give PO  - seizure precautions  - aspiration precautions  - given ventriculomegaly on CT, possible patient's cause of seizures is NPH? Neurology (dr raymundo) consulted, f/u recs possible patient has been throwing up her anti-seizure meds; suspect that patient has been throwing up 2/2 oxycodone  CTH: No acute intracranial hemorrhage, vasogenic edema or extra-axial   collection. Ventriculomegaly.  - will give home vimpat through IV 200mg bid  - aptiom unavailable via IV, will give PO  - seizure precautions  - aspiration precautions  - given ventriculomegaly on CT, possible patient's cause of seizures is NPH?   - Neurology (dr raymundo) consulted, f/u recs

## 2023-09-08 NOTE — H&P ADULT - PROBLEM SELECTOR PLAN 2
patient has been vomiting on and off since 9/5; possible 2/2 oxycodone given to pt for pain after shoulder surgery  CTAP: No bowel obstruction or grossly thickened bowel wall. Appendix within   normal limits. Small hiatal hernia, unchanged. 2.0 cm cystic structure in the left adnexa, likely representing a left  ovarian cyst.  - will hold opiates  - zofran prn  - gi pcr

## 2023-09-09 DIAGNOSIS — Z98.890 OTHER SPECIFIED POSTPROCEDURAL STATES: ICD-10-CM

## 2023-09-09 DIAGNOSIS — D64.9 ANEMIA, UNSPECIFIED: ICD-10-CM

## 2023-09-09 LAB
ALBUMIN SERPL ELPH-MCNC: 3.1 G/DL — LOW (ref 3.3–5)
ALP SERPL-CCNC: 66 U/L — SIGNIFICANT CHANGE UP (ref 40–120)
ALT FLD-CCNC: 11 U/L — LOW (ref 12–78)
ANION GAP SERPL CALC-SCNC: 7 MMOL/L — SIGNIFICANT CHANGE UP (ref 5–17)
AST SERPL-CCNC: 21 U/L — SIGNIFICANT CHANGE UP (ref 15–37)
BASOPHILS # BLD AUTO: 0.04 K/UL — SIGNIFICANT CHANGE UP (ref 0–0.2)
BASOPHILS NFR BLD AUTO: 0.6 % — SIGNIFICANT CHANGE UP (ref 0–2)
BILIRUB SERPL-MCNC: 0.3 MG/DL — SIGNIFICANT CHANGE UP (ref 0.2–1.2)
BUN SERPL-MCNC: 9 MG/DL — SIGNIFICANT CHANGE UP (ref 7–23)
CALCIUM SERPL-MCNC: 8.7 MG/DL — SIGNIFICANT CHANGE UP (ref 8.5–10.1)
CHLORIDE SERPL-SCNC: 109 MMOL/L — HIGH (ref 96–108)
CO2 SERPL-SCNC: 23 MMOL/L — SIGNIFICANT CHANGE UP (ref 22–31)
CREAT SERPL-MCNC: 0.68 MG/DL — SIGNIFICANT CHANGE UP (ref 0.5–1.3)
EGFR: 107 ML/MIN/1.73M2 — SIGNIFICANT CHANGE UP
EOSINOPHIL # BLD AUTO: 0.03 K/UL — SIGNIFICANT CHANGE UP (ref 0–0.5)
EOSINOPHIL NFR BLD AUTO: 0.4 % — SIGNIFICANT CHANGE UP (ref 0–6)
GLUCOSE SERPL-MCNC: 84 MG/DL — SIGNIFICANT CHANGE UP (ref 70–99)
HCT VFR BLD CALC: 27.7 % — LOW (ref 34.5–45)
HGB BLD-MCNC: 8.5 G/DL — LOW (ref 11.5–15.5)
IMM GRANULOCYTES NFR BLD AUTO: 0.3 % — SIGNIFICANT CHANGE UP (ref 0–0.9)
LYMPHOCYTES # BLD AUTO: 1.94 K/UL — SIGNIFICANT CHANGE UP (ref 1–3.3)
LYMPHOCYTES # BLD AUTO: 27.6 % — SIGNIFICANT CHANGE UP (ref 13–44)
MCHC RBC-ENTMCNC: 25.5 PG — LOW (ref 27–34)
MCHC RBC-ENTMCNC: 30.7 GM/DL — LOW (ref 32–36)
MCV RBC AUTO: 83.2 FL — SIGNIFICANT CHANGE UP (ref 80–100)
MONOCYTES # BLD AUTO: 0.64 K/UL — SIGNIFICANT CHANGE UP (ref 0–0.9)
MONOCYTES NFR BLD AUTO: 9.1 % — SIGNIFICANT CHANGE UP (ref 2–14)
NEUTROPHILS # BLD AUTO: 4.35 K/UL — SIGNIFICANT CHANGE UP (ref 1.8–7.4)
NEUTROPHILS NFR BLD AUTO: 62 % — SIGNIFICANT CHANGE UP (ref 43–77)
NRBC # BLD: 0 /100 WBCS — SIGNIFICANT CHANGE UP (ref 0–0)
PLATELET # BLD AUTO: 353 K/UL — SIGNIFICANT CHANGE UP (ref 150–400)
POTASSIUM SERPL-MCNC: 3.4 MMOL/L — LOW (ref 3.5–5.3)
POTASSIUM SERPL-SCNC: 3.4 MMOL/L — LOW (ref 3.5–5.3)
PROT SERPL-MCNC: 6.6 G/DL — SIGNIFICANT CHANGE UP (ref 6–8.3)
RBC # BLD: 3.33 M/UL — LOW (ref 3.8–5.2)
RBC # FLD: 15.7 % — HIGH (ref 10.3–14.5)
SODIUM SERPL-SCNC: 139 MMOL/L — SIGNIFICANT CHANGE UP (ref 135–145)
WBC # BLD: 7.02 K/UL — SIGNIFICANT CHANGE UP (ref 3.8–10.5)
WBC # FLD AUTO: 7.02 K/UL — SIGNIFICANT CHANGE UP (ref 3.8–10.5)

## 2023-09-09 PROCEDURE — 73030 X-RAY EXAM OF SHOULDER: CPT | Mod: 26,RT

## 2023-09-09 PROCEDURE — 99233 SBSQ HOSP IP/OBS HIGH 50: CPT | Mod: GC

## 2023-09-09 RX ORDER — POLYETHYLENE GLYCOL 3350 17 G/17G
17 POWDER, FOR SOLUTION ORAL DAILY
Refills: 0 | Status: DISCONTINUED | OUTPATIENT
Start: 2023-09-09 | End: 2023-09-10

## 2023-09-09 RX ORDER — ALPRAZOLAM 0.25 MG
2 TABLET ORAL THREE TIMES A DAY
Refills: 0 | Status: DISCONTINUED | OUTPATIENT
Start: 2023-09-09 | End: 2023-09-10

## 2023-09-09 RX ORDER — TRAMADOL HYDROCHLORIDE 50 MG/1
50 TABLET ORAL EVERY 6 HOURS
Refills: 0 | Status: DISCONTINUED | OUTPATIENT
Start: 2023-09-09 | End: 2023-09-10

## 2023-09-09 RX ORDER — POTASSIUM CHLORIDE 20 MEQ
40 PACKET (EA) ORAL ONCE
Refills: 0 | Status: COMPLETED | OUTPATIENT
Start: 2023-09-09 | End: 2023-09-09

## 2023-09-09 RX ORDER — INFLUENZA VIRUS VACCINE 15; 15; 15; 15 UG/.5ML; UG/.5ML; UG/.5ML; UG/.5ML
0.5 SUSPENSION INTRAMUSCULAR ONCE
Refills: 0 | Status: DISCONTINUED | OUTPATIENT
Start: 2023-09-09 | End: 2023-09-10

## 2023-09-09 RX ORDER — TRAMADOL HYDROCHLORIDE 50 MG/1
25 TABLET ORAL ONCE
Refills: 0 | Status: DISCONTINUED | OUTPATIENT
Start: 2023-09-09 | End: 2023-09-09

## 2023-09-09 RX ORDER — LACOSAMIDE 50 MG/1
200 TABLET ORAL
Refills: 0 | Status: DISCONTINUED | OUTPATIENT
Start: 2023-09-09 | End: 2023-09-10

## 2023-09-09 RX ORDER — SENNA PLUS 8.6 MG/1
2 TABLET ORAL AT BEDTIME
Refills: 0 | Status: DISCONTINUED | OUTPATIENT
Start: 2023-09-09 | End: 2023-09-10

## 2023-09-09 RX ORDER — TRAMADOL HYDROCHLORIDE 50 MG/1
25 TABLET ORAL EVERY 6 HOURS
Refills: 0 | Status: DISCONTINUED | OUTPATIENT
Start: 2023-09-09 | End: 2023-09-10

## 2023-09-09 RX ADMIN — CITALOPRAM 20 MILLIGRAM(S): 10 TABLET, FILM COATED ORAL at 11:29

## 2023-09-09 RX ADMIN — Medication 650 MILLIGRAM(S): at 10:46

## 2023-09-09 RX ADMIN — Medication 2 MILLIGRAM(S): at 14:39

## 2023-09-09 RX ADMIN — TRAMADOL HYDROCHLORIDE 25 MILLIGRAM(S): 50 TABLET ORAL at 01:26

## 2023-09-09 RX ADMIN — Medication 650 MILLIGRAM(S): at 10:16

## 2023-09-09 RX ADMIN — Medication 40 MILLIEQUIVALENT(S): at 10:16

## 2023-09-09 RX ADMIN — TRAMADOL HYDROCHLORIDE 25 MILLIGRAM(S): 50 TABLET ORAL at 11:58

## 2023-09-09 RX ADMIN — ESLICARBAZEPINE ACETATE 800 MILLIGRAM(S): 800 TABLET ORAL at 01:24

## 2023-09-09 RX ADMIN — Medication 2 MILLIGRAM(S): at 21:09

## 2023-09-09 RX ADMIN — TRAMADOL HYDROCHLORIDE 25 MILLIGRAM(S): 50 TABLET ORAL at 11:28

## 2023-09-09 RX ADMIN — ESLICARBAZEPINE ACETATE 800 MILLIGRAM(S): 800 TABLET ORAL at 20:57

## 2023-09-09 RX ADMIN — LACOSAMIDE 140 MILLIGRAM(S): 50 TABLET ORAL at 06:03

## 2023-09-09 RX ADMIN — ENOXAPARIN SODIUM 40 MILLIGRAM(S): 100 INJECTION SUBCUTANEOUS at 17:09

## 2023-09-09 RX ADMIN — PANTOPRAZOLE SODIUM 40 MILLIGRAM(S): 20 TABLET, DELAYED RELEASE ORAL at 06:26

## 2023-09-09 RX ADMIN — LACOSAMIDE 200 MILLIGRAM(S): 50 TABLET ORAL at 17:09

## 2023-09-09 NOTE — PATIENT PROFILE ADULT - LIVING ENVIRONMENT
History of + THC, tobacco use, prescription xanax.  UDS and meconium ordered.  LCSW consulted.  
History of tobacco use and poor PNC.  Hypoglycemia protocol.  
History of tobacco use and poor PNC.  Hypoglycemia protocol.  
Monitor x 48 hours.  
Monitor x 48 hours.  
Routine  care  
Routine  care  
UDS and meconium ordered.  LCSW consulted.  
no

## 2023-09-09 NOTE — CARE COORDINATION ASSESSMENT. - NSCAREPROVIDERS_GEN_ALL_CORE_FT
CARE PROVIDERS:  Accepting Physician: Pilar Preciado  Administration: Argenis Swift  Admitting: Pilar Preciado  Attending: Pilar Preciado  Case Management: Bell Bah  Case Management: Anita Trevino  Consultant: Lucero Brown  Covering Team: Lucero Brown  Covering Team: Pilar Preciado  ED Attending: Valdemar Harper  ED Nurse: Nadia Galindo  Nurse: Sandra Park  Nurse: Anita Galeas  Nurse: Karley Laura  Nurse: Perry Danielson  Ordered: ADM, User  Override: Sandra Park  PCA/Nursing Assistant: Riri Krishnan  Primary Team: Montez Greenberg  Primary Team: Carmen Pagan  Primary Team: Perry Jean  Primary Team: Ruby Everett  Registered Dietitian: Alessandra Stapleton  Respiratory Therapy: Mauri Porter// Supp. Assoc.: Katharine Han

## 2023-09-09 NOTE — CARE COORDINATION ASSESSMENT. - NSPASTMEDSURGHISTORY_GEN_ALL_CORE_FT
PAST MEDICAL & SURGICAL HISTORY:  ETOH abuse  Pt denies      Anxiety and depression      Large bowel obstruction  2014, post op      H/O fall  6/14/19      Ankle fracture  left, 2016      Seizures      Panic attacks      Rosacea      History of open reduction and internal fixation (ORIF) procedure  left ankle      H/O exploratory laparotomy      History of rhinoplasty      History of tonsillectomy      H/O total hysterectomy      Elective surgery  screws removed from right shoulder oct 2020      Elective surgery  laparoscopy adhesions removed- sept 2020      H/O total shoulder replacement, right      Seizures

## 2023-09-09 NOTE — PATIENT PROFILE ADULT - FALL HARM RISK - RISK INTERVENTIONS

## 2023-09-09 NOTE — PROGRESS NOTE ADULT - ASSESSMENT
46 y/o pmhx seizure disorder, anxiety/depression presented with seizures. S/p R shoulder replacement on 8/31, Hx of multiple dislocations. Admitted for management of seizure.

## 2023-09-09 NOTE — PROGRESS NOTE ADULT - PROBLEM SELECTOR PLAN 2
Pt has been vomiting on and off since 9/5; possible 2/2 oxycodone for post-op pain - RESOLVED  CTAP: No bowel obstruction or grossly thickened bowel wall. Appendix within normal limits. Small hiatal hernia, unchanged. 2.0 cm cystic structure in the left adnexa, likely representing a left ovarian cyst.  - Pt trialed on tramadol 25mg x2 prn, tolerating well, no sx of nausea/vomiting  - will hold stronger opiates  - continue zofran prn

## 2023-09-09 NOTE — PROGRESS NOTE ADULT - PROBLEM SELECTOR PLAN 1
possible Pt has been vomiting her anti-seizure meds; suspect that patient has been throwing up 2/2 oxycodone  CTH: No acute intracranial hemorrhage, vasogenic edema or extra-axial collection. Ventriculomegaly.  - continue home vimpat and aptiom as Pt can tolerate oral meds  - seizure precautions  - aspiration precautions  - given ventriculomegaly on CT, possible patient's cause of seizures is NPH?   - Pt takes 2mg Xanax TID for anxiety. Pt mother reports she received 120 pill refilled in 8/23 and found the bottle missing. States Pt takes 2mg up to 4x a day. Possible etiology of seizures? However Pt is not obtunded on exam. Pt reports that she hides her pills throughout the house and that she only takes 2mg TID. Will resume 2mg TID for now to prevent withdrawal.   - Neurology (dr raymundo) following  - Pulm (Dr. Bolaños) consulted for possible substance abuse, will f/u recs. possible Pt has been vomiting her anti-seizure meds; suspect that patient has been throwing up 2/2 oxycodone  CTH: No acute intracranial hemorrhage, vasogenic edema or extra-axial collection. Ventriculomegaly.  - continue home vimpat and aptiom as Pt can tolerate oral meds  - seizure precautions  - aspiration precautions  - per neurology - restart po seizure medications. stable for d/c  - Pt takes 2mg Xanax TID for anxiety. Pt mother reports she received 120 pill refilled in 8/23 and found the bottle missing. States Pt takes 2mg up to 4x a day. Pt states she did not use all pills so doubt this is acute withdrawal. Pt reports that she hides her pills throughout the house and that she only takes 2mg TID. Will resume 2mg TID for now to prevent withdrawal.   - Neurology (dr raymundo) following  - Pulm (Dr. Bolaños) consulted for possible substance abuse, will f/u recs.

## 2023-09-09 NOTE — PROGRESS NOTE ADULT - PROBLEM SELECTOR PLAN 5
patient on both famotidine and protonix at home - Pt reports only taking protonix, not famotidine.  - c/w protonix Hgb from 10 -> 8.5. No Hx of transfusions  - baseline appears to be 7-8 as per chart review  - asymptomatic at this time  - will f/u AM CBC Hgb from 10 -> 8.5. No Hx of transfusions.  - baseline appears to be ~8 as per chart review (unclear why)  - asymptomatic at this time, however Pt had recent orthopedic surgery.  - maintain active T&S   - Transfuse if <7  - will f/u AM CBC

## 2023-09-09 NOTE — CARE COORDINATION ASSESSMENT. - OTHER PERTINENT REFERRAL INFORMATION
Met with the patient at the bedside. Explained the role of case management/discharge planning. Patient verbalized understanding. Provided contact information and discharge planning packet. Patient resides with her parents and was independent PTA. Patient admitted with seizure activity. Patient stated she recently has a right shoulder replacement. No anticipated skilled discharge needs noted at the present time. Will remain available to patient and family throughout hospital stay

## 2023-09-09 NOTE — PROGRESS NOTE ADULT - PROBLEM SELECTOR PLAN 6
dvt proph: lovenox 40    #Dispo:  Home - Pt mother states unable to  tomorrow 9/10, will need to be dced on 9/11 earliest. patient on both famotidine and protonix at home - Pt reports only taking protonix, not famotidine.  - c/w protonix

## 2023-09-09 NOTE — PROGRESS NOTE ADULT - SUBJECTIVE AND OBJECTIVE BOX
Patient is a 47y old  Female who presents with a chief complaint of seizures (08 Sep 2023 20:41)      INTERVAL HPI/OVERNIGHT EVENTS: Patient was seen and examined at bedside. No acute overnight events. Patient reports feeling much better and wants to go home. No longer reports nausea and vomiting. Pt states tramadol has been helping with the R shoulder pain and she is tolerating it well. Reports tongue pain due to lacerations from seizure. Pt report slightly unstable gait, but no reported incidence of falls. No urinary Sx. Of note, Pt mother called and asked for the doctor to speak to her ortho surgeon. She had multiple R shoulder dislocations in the past requiring surgery, and a recent shoulder replacement last week. They are concerned of another dislocation post seizure. Mother also reports that Pt takes 2mg xanax up to 4x a day, states she found her pill bottle that was empty. Reports getting 120 pills back in 8/23. Unclear if she took more recently. When asking the patient, she states the mother doesn't know what shes talking about. She regularly takes 2mg xanax 3x a day and that she has the pills in other areas of the house.     MEDICATIONS  (STANDING):  ALPRAZolam 2 milliGRAM(s) Oral three times a day  citalopram 20 milliGRAM(s) Oral daily  enoxaparin Injectable 40 milliGRAM(s) SubCutaneous every 24 hours  eslicarbazepine 800 milliGRAM(s) Oral daily  influenza   Vaccine 0.5 milliLiter(s) IntraMuscular once  lacosamide IVPB 200 milliGRAM(s) IV Intermittent every 12 hours  pantoprazole    Tablet 40 milliGRAM(s) Oral before breakfast    MEDICATIONS  (PRN):  acetaminophen     Tablet .. 650 milliGRAM(s) Oral every 6 hours PRN Temp greater or equal to 38C (100.4F), Mild Pain (1 - 3)  aluminum hydroxide/magnesium hydroxide/simethicone Suspension 30 milliLiter(s) Oral every 4 hours PRN Dyspepsia  melatonin 3 milliGRAM(s) Oral at bedtime PRN Insomnia  ondansetron Injectable 4 milliGRAM(s) IV Push every 8 hours PRN Nausea and/or Vomiting      Allergies    No Known Allergies    Intolerances        REVIEW OF SYSTEMS:  CONSTITUTIONAL: No fever or chills  HEENT:  admits tongue pain, No headache, no sore throat  RESPIRATORY: No cough or shortness of breath  CARDIOVASCULAR: No chest pain, palpitations  GASTROINTESTINAL: No abd pain, nausea, vomiting, or diarrhea  GENITOURINARY: No dysuria, frequency, or hematuria  NEUROLOGICAL: no focal weakness or dizziness  MUSCULOSKELETAL: admits R shoulder pain      Vital Signs Last 24 Hrs  T(C): 37.1 (09 Sep 2023 11:45), Max: 37.1 (09 Sep 2023 11:45)  T(F): 98.8 (09 Sep 2023 11:45), Max: 98.8 (09 Sep 2023 11:45)  HR: 88 (09 Sep 2023 11:45) (88 - 101)  BP: 104/69 (09 Sep 2023 11:45) (104/64 - 128/76)  BP(mean): --  RR: 18 (09 Sep 2023 11:45) (16 - 18)  SpO2: 97% (09 Sep 2023 11:45) (94% - 99%)    Parameters below as of 09 Sep 2023 11:45  Patient On (Oxygen Delivery Method): room air        PHYSICAL EXAM:  GENERAL: NAD, Pt resting comfortably in bed, R arm sling in place, pleasant  HEENT:  + lacerations on lateral edges of both sides of the tongue - not bleeding or erythematous, anicteric, moist mucous membranes  CHEST/LUNG:  CTA b/l, no rales, wheezes, or rhonchi  HEART:  RRR, S1, S2  ABDOMEN:  BS+, soft, nontender, nondistended  MUSCULOSKELETAL: R arm in sling with clean dressings in place over R shoulder. Unable to assess ROM or sensation. no edema, cyanosis, or calf tenderness  NEUROLOGIC: answers questions and follows commands appropriately      LABS:                        8.5    7.02  )-----------( 353      ( 09 Sep 2023 04:56 )             27.7     CBC Full  -  ( 09 Sep 2023 04:56 )  WBC Count : 7.02 K/uL  Hemoglobin : 8.5 g/dL  Hematocrit : 27.7 %  Platelet Count - Automated : 353 K/uL  Mean Cell Volume : 83.2 fl  Mean Cell Hemoglobin : 25.5 pg  Mean Cell Hemoglobin Concentration : 30.7 gm/dL  Auto Neutrophil # : 4.35 K/uL  Auto Lymphocyte # : 1.94 K/uL  Auto Monocyte # : 0.64 K/uL  Auto Eosinophil # : 0.03 K/uL  Auto Basophil # : 0.04 K/uL  Auto Neutrophil % : 62.0 %  Auto Lymphocyte % : 27.6 %  Auto Monocyte % : 9.1 %  Auto Eosinophil % : 0.4 %  Auto Basophil % : 0.6 %    09 Sep 2023 04:56    139    |  109    |  9      ----------------------------<  84     3.4     |  23     |  0.68     Ca    8.7        09 Sep 2023 04:56  Phos  2.2       08 Sep 2023 16:30  Mg     2.3       08 Sep 2023 16:30    TPro  6.6    /  Alb  3.1    /  TBili  0.3    /  DBili  x      /  AST  21     /  ALT  11     /  AlkPhos  66     09 Sep 2023 04:56      Urinalysis Basic - ( 09 Sep 2023 04:56 )    Color: x / Appearance: x / SG: x / pH: x  Gluc: 84 mg/dL / Ketone: x  / Bili: x / Urobili: x   Blood: x / Protein: x / Nitrite: x   Leuk Esterase: x / RBC: x / WBC x   Sq Epi: x / Non Sq Epi: x / Bacteria: x      CAPILLARY BLOOD GLUCOSE              RADIOLOGY & ADDITIONAL TESTS:  < from: CT Abdomen and Pelvis w/ IV Cont (09.08.23 @ 19:41) >  IMPRESSION:  No bowel obstruction or grossly thickened bowel wall. Appendix within   normal limits. Small hiatal hernia, unchanged.    2.0 cm cystic structure in the left adnexa, likely representing a left   ovarian cyst.    < end of copied text >  < from: Xray Shoulder 2 Views, Right (09.09.23 @ 14:08) >  IMPRESSION: Reverse total shoulder arthroplasty components in proper   anatomical alignment.    --- End of Report ---    < end of copied text >    Personally reviewed.     Consultant(s) Notes Reviewed:  [x] YES  [ ] NO

## 2023-09-09 NOTE — PROGRESS NOTE ADULT - PROBLEM SELECTOR PLAN 4
Hx of R shoulder surgery x3 for dislocation.  - Dr. Holden ([phone]) asked to reach out to hospital staff for concern of redislocation following seizure.  - XR of R shoulder: Reverse total shoulder arthroplasty components in proper anatomical alignment.  - Pt has plans to follow up outpatient w/ ortho on Monday.  - Previously on oxycodone for pain, but was not tolerating well.  - Trialed tramadol 25mg x2 - tolerating well.   - pain regimen: PRN tramadol 25mg for mod, 50mg for severe q6

## 2023-09-09 NOTE — PROGRESS NOTE ADULT - TIME BILLING
Pt seen + examined. Case discussed with resident. Agree with assessment and plan above (edited by me in detail):  Time spent: 50min. More than 50% of the visit was spent counseling the patient on medical condition and coordination of care, excluding teaching time.

## 2023-09-09 NOTE — PROGRESS NOTE ADULT - PROBLEM SELECTOR PLAN 7
dvt proph: lovenox 40    #Dispo:  Home - Pt mother states unable to  tomorrow 9/10, will need to be dced on 9/11 earliest. dvt proph: lovenox 40    #Dispo:  Home - Pt mother states unable to  tomorrow 9/10, will need to be dced on 9/11 earliest. dispo is still pending addiction med eval

## 2023-09-10 ENCOUNTER — TRANSCRIPTION ENCOUNTER (OUTPATIENT)
Age: 48
End: 2023-09-10

## 2023-09-10 VITALS
TEMPERATURE: 98 F | HEART RATE: 79 BPM | RESPIRATION RATE: 18 BRPM | WEIGHT: 206.13 LBS | OXYGEN SATURATION: 97 % | DIASTOLIC BLOOD PRESSURE: 85 MMHG | SYSTOLIC BLOOD PRESSURE: 125 MMHG

## 2023-09-10 LAB
ANION GAP SERPL CALC-SCNC: 6 MMOL/L — SIGNIFICANT CHANGE UP (ref 5–17)
BUN SERPL-MCNC: 14 MG/DL — SIGNIFICANT CHANGE UP (ref 7–23)
CALCIUM SERPL-MCNC: 9 MG/DL — SIGNIFICANT CHANGE UP (ref 8.5–10.1)
CHLORIDE SERPL-SCNC: 109 MMOL/L — HIGH (ref 96–108)
CO2 SERPL-SCNC: 27 MMOL/L — SIGNIFICANT CHANGE UP (ref 22–31)
CREAT SERPL-MCNC: 0.73 MG/DL — SIGNIFICANT CHANGE UP (ref 0.5–1.3)
EGFR: 101 ML/MIN/1.73M2 — SIGNIFICANT CHANGE UP
GLUCOSE SERPL-MCNC: 88 MG/DL — SIGNIFICANT CHANGE UP (ref 70–99)
HCT VFR BLD CALC: 29.5 % — LOW (ref 34.5–45)
HGB BLD-MCNC: 9.2 G/DL — LOW (ref 11.5–15.5)
MAGNESIUM SERPL-MCNC: 2.1 MG/DL — SIGNIFICANT CHANGE UP (ref 1.6–2.6)
MCHC RBC-ENTMCNC: 26.1 PG — LOW (ref 27–34)
MCHC RBC-ENTMCNC: 31.2 GM/DL — LOW (ref 32–36)
MCV RBC AUTO: 83.8 FL — SIGNIFICANT CHANGE UP (ref 80–100)
NRBC # BLD: 0 /100 WBCS — SIGNIFICANT CHANGE UP (ref 0–0)
PHOSPHATE SERPL-MCNC: 3.2 MG/DL — SIGNIFICANT CHANGE UP (ref 2.5–4.5)
PLATELET # BLD AUTO: 403 K/UL — HIGH (ref 150–400)
POTASSIUM SERPL-MCNC: 4.3 MMOL/L — SIGNIFICANT CHANGE UP (ref 3.5–5.3)
POTASSIUM SERPL-SCNC: 4.3 MMOL/L — SIGNIFICANT CHANGE UP (ref 3.5–5.3)
RBC # BLD: 3.52 M/UL — LOW (ref 3.8–5.2)
RBC # FLD: 15.7 % — HIGH (ref 10.3–14.5)
SODIUM SERPL-SCNC: 142 MMOL/L — SIGNIFICANT CHANGE UP (ref 135–145)
WBC # BLD: 7.6 K/UL — SIGNIFICANT CHANGE UP (ref 3.8–10.5)
WBC # FLD AUTO: 7.6 K/UL — SIGNIFICANT CHANGE UP (ref 3.8–10.5)

## 2023-09-10 PROCEDURE — 96365 THER/PROPH/DIAG IV INF INIT: CPT

## 2023-09-10 PROCEDURE — 80053 COMPREHEN METABOLIC PANEL: CPT

## 2023-09-10 PROCEDURE — 93005 ELECTROCARDIOGRAM TRACING: CPT

## 2023-09-10 PROCEDURE — 84100 ASSAY OF PHOSPHORUS: CPT

## 2023-09-10 PROCEDURE — 70450 CT HEAD/BRAIN W/O DYE: CPT | Mod: MA

## 2023-09-10 PROCEDURE — 86900 BLOOD TYPING SEROLOGIC ABO: CPT

## 2023-09-10 PROCEDURE — 81001 URINALYSIS AUTO W/SCOPE: CPT

## 2023-09-10 PROCEDURE — 99285 EMERGENCY DEPT VISIT HI MDM: CPT

## 2023-09-10 PROCEDURE — 85025 COMPLETE CBC W/AUTO DIFF WBC: CPT

## 2023-09-10 PROCEDURE — 36415 COLL VENOUS BLD VENIPUNCTURE: CPT

## 2023-09-10 PROCEDURE — 86901 BLOOD TYPING SEROLOGIC RH(D): CPT

## 2023-09-10 PROCEDURE — 83735 ASSAY OF MAGNESIUM: CPT

## 2023-09-10 PROCEDURE — 99239 HOSP IP/OBS DSCHRG MGMT >30: CPT | Mod: GC

## 2023-09-10 PROCEDURE — 84702 CHORIONIC GONADOTROPIN TEST: CPT

## 2023-09-10 PROCEDURE — C9254: CPT

## 2023-09-10 PROCEDURE — 74177 CT ABD & PELVIS W/CONTRAST: CPT | Mod: MA

## 2023-09-10 PROCEDURE — 80048 BASIC METABOLIC PNL TOTAL CA: CPT

## 2023-09-10 PROCEDURE — 73030 X-RAY EXAM OF SHOULDER: CPT

## 2023-09-10 PROCEDURE — 83690 ASSAY OF LIPASE: CPT

## 2023-09-10 PROCEDURE — 85027 COMPLETE CBC AUTOMATED: CPT

## 2023-09-10 PROCEDURE — 96366 THER/PROPH/DIAG IV INF ADDON: CPT

## 2023-09-10 PROCEDURE — 86850 RBC ANTIBODY SCREEN: CPT

## 2023-09-10 PROCEDURE — 96368 THER/DIAG CONCURRENT INF: CPT

## 2023-09-10 PROCEDURE — 87635 SARS-COV-2 COVID-19 AMP PRB: CPT

## 2023-09-10 PROCEDURE — 96375 TX/PRO/DX INJ NEW DRUG ADDON: CPT

## 2023-09-10 RX ORDER — ALPRAZOLAM 0.25 MG
1 TABLET ORAL
Qty: 0 | Refills: 0 | DISCHARGE

## 2023-09-10 RX ADMIN — Medication 2 MILLIGRAM(S): at 06:10

## 2023-09-10 RX ADMIN — PANTOPRAZOLE SODIUM 40 MILLIGRAM(S): 20 TABLET, DELAYED RELEASE ORAL at 06:07

## 2023-09-10 RX ADMIN — LACOSAMIDE 200 MILLIGRAM(S): 50 TABLET ORAL at 06:06

## 2023-09-10 NOTE — DISCHARGE NOTE PROVIDER - CARE PROVIDER_API CALL
Deb Avalos  Neurology  [address] [place] [address]  Phone: [phone]  Fax: [phone]  Established Patient  Follow Up Time:

## 2023-09-10 NOTE — DISCHARGE NOTE NURSING/CASE MANAGEMENT/SOCIAL WORK - NSDCPEFALRISK_GEN_ALL_CORE
For information on Fall & Injury Prevention, visit: [email or web address] OR  [email or web address] OR  https://www.cdc.gov/steadi/patient.html

## 2023-09-10 NOTE — DISCHARGE NOTE NURSING/CASE MANAGEMENT/SOCIAL WORK - PATIENT PORTAL LINK FT
You can access the FollowMyHealth Patient Portal offered by [facility] by registering at the following website: [email or web address]. By joining [facility]’s FollowMyHealth portal, you will also be able to view your health information using other applications (apps) compatible with our system.

## 2023-09-10 NOTE — CONSULT NOTE ADULT - ASSESSMENT
46 y/o pmhx seizure disorder, anxiety/depression presents with seizure. Patient had a shoulder surgery done last week on her right shoulder at [facility] and was discharged 8/31 on oxycodone. Per mother she has been vomiting after taking this medication since 9/5 and believes Lizet has been vomiting up her ant-seizure meds    seizures  etoh use  AGATHA  Depression  Anxiety   46 y/o pmhx seizure disorder, anxiety/depression presents with seizure. Patient had a shoulder surgery done last week on her right shoulder at [facility] and was discharged 8/31 on oxycodone. Per mother she has been vomiting after taking this medication since 9/5 and believes Lizet has been vomiting up her ant-seizure meds    seizures  etoh use  AGATHA  Depression  Anxiety    discussed Etoh use  discussed Benzo use  discussed concerns and or anxiety issues  pt does not feel she has a AGATHA - Benzo use disorder issue  education  counseling  emotional support  caution with Opioids and Benzo use in this pt  curr on Xanax for anxiety  iStop reviewed  dc home planned  will f/u with PMD and recommended outpatient Addiction med follow up with Dr Nara rodrigues office

## 2023-09-10 NOTE — DISCHARGE NOTE PROVIDER - NSDCCPCAREPLAN_GEN_ALL_CORE_FT
PRINCIPAL DISCHARGE DIAGNOSIS  Diagnosis: Breakthrough seizure  Assessment and Plan of Treatment: You came to the hospital with a seizure. We believe this may be due to your episodes of vomiting or even your Xanax use. We restarted your home seizure medications in IV form. Once your vomiting improved we restarted your home medications.  Please see your neurologist upon discharge for medication management. Please also refer to them for assistance with tapering off your Xanax. We have explained to you the risks of this medication and with the effects of its withdrawal that could cause more seizure or even death.      SECONDARY DISCHARGE DIAGNOSES  Diagnosis: S/P surgery for recurrent dislocation of shoulder  Assessment and Plan of Treatment: You came in with a recent right shoulder replacement with your orthopedist. Seizures can cause re-dislocation. We imaged your shoulder and found that there was no dislocation.   Please follow up with your orthopedist following discharge for further management.     PRINCIPAL DISCHARGE DIAGNOSIS  Diagnosis: Seizures  Assessment and Plan of Treatment: You presented to the hospital with seizures. This is likely due to vomiting after taking your seizure medications. When your vomiting improved you were started back on oral seizure medications and you had no further seizure activity. Please follow up with your primary doctor and neurologist within 1 week.      SECONDARY DISCHARGE DIAGNOSES  Diagnosis: Anxiety and depression  Assessment and Plan of Treatment: Please see your neurologist upon discharge for medication management. Please also refer to them for assistance with tapering off your Xanax. We have explained to you the risks of this medication and with the effects of its withdrawal that could cause more seizure or even death. You did not want to remain in the hospital to speak with social work about resources for cessation.  It is incredibly important you do not attempt to stop your xanax without supervision from a physician. This could lead to seizures and death.    Diagnosis: S/P surgery for recurrent dislocation of shoulder  Assessment and Plan of Treatment: You came in with a recent right shoulder replacement with your orthopedist. Seizures can cause re-dislocation. We imaged your shoulder and found that there was no dislocation.   Please follow up with your orthopedist following discharge for further management.    Diagnosis: Seizures  Assessment and Plan of Treatment:

## 2023-09-10 NOTE — DISCHARGE NOTE PROVIDER - NSDCMRMEDTOKEN_GEN_ALL_CORE_FT
Aptiom 800 mg oral tablet: orally once a day (at bedtime)  citalopram 20 mg oral tablet: 1 tab(s) orally once a day  famotidine 20 mg oral tablet: 1 tab(s) orally once a day  meloxicam 15 mg oral tablet: 1 tab(s) orally once a day  pantoprazole 40 mg oral delayed release tablet: 1 tab(s) orally once a day  Vimpat 200 mg oral tablet: orally 2 times a day  Xanax 2 mg oral tablet: 1 tab(s) orally 2 times a day   Aptiom 800 mg oral tablet: orally once a day (at bedtime)  citalopram 20 mg oral tablet: 1 tab(s) orally once a day  famotidine 20 mg oral tablet: 1 tab(s) orally once a day  meloxicam 15 mg oral tablet: 1 tab(s) orally once a day  pantoprazole 40 mg oral delayed release tablet: 1 tab(s) orally once a day  Vimpat 200 mg oral tablet: orally 2 times a day  Xanax 2 mg oral tablet: 1 tab(s) orally 3 times a day

## 2023-09-10 NOTE — DISCHARGE NOTE PROVIDER - HOSPITAL COURSE
ADMISSION DATE:  09-08-23    ---  FROM ADMISSION H+P:   HPI:  46 y/o pmhx seizure disorder, anxiety/depression presents with seizure. Patient had a shoulder surgery done last week on her right shoulder at [facility] and was discharged 8/31 on oxycodone. Per mother she has been vomiting after taking this medication since 9/5 and believes Lizet has been vomiting up her ant-seizure meds. Patient states she feels she had a seizure last night, as she woke up with a tongue laceration. Per mother, daughter was saying abnormal things this morning and then had a ~2 minute long tonic-clonic seizure, after which she was brought to the ED. The patient admits to a slight headache, intermittent nausea and vomiting, but denies other symptoms such as chest pain, palpitations, shortness of breath, abdominal pain, fevers chills, diarrhea. Patient states she has been having seizures since ~age 38, last seizure ~2 years ago.    ed:  vitals - bp 110/82, rr 16, hr 101, t97.6, spo2 96  labs - wbc 10.8, hgb 10.1, plt 539, k 3.4  imaging: CTAP: No bowel obstruction or grossly thickened bowel wall. Appendix within   normal limits. Small hiatal hernia, unchanged.    2.0 cm cystic structure in the left adnexa, likely representing a left   ovarian cyst.  CTH: No acute intracranial hemorrhage, vasogenic edema or extra-axial   collection. Ventriculomegaly.  EKG - nsr 72  meds given - toradol, vimpat, zofran, tylenol, bolus   (08 Sep 2023 20:41)      ---  HOSPITAL COURSE/PERTINENT LABS/PROCEDURES PERFORMED/PENDING TESTS:   Pt was admitted for management of seizure. Neurology was consulted. Pt started home seizure meds, but IV form due to vomiting. Once she tolerated PO, transitioned back to her PO dosage. Of note, Pt reports taking Xanax 2mg TID, but as per mother, she had 120 pills refilled on 8/23 and found it missing. Addiction medicine was consulted. Pt denies these claims and states she hides them around the house and reports only taking 2mg TID consistently. Resumed on this regimen to prevent withdrawal. Patient also has Hx of multiple R shoulder dislocation, previously done on 8/31. XR of R shoulder did not reveal dislocation. Pain was managed with tramadol, which patient tolerated well. She is was instructed to follow up outpatient with her neurologist for safe benzo taper. She was made aware of the risks of withdrawal.       Patient is stable for discharge as per primary medical team and consultants.      Patient showed improvement throughout hospitalization. Patient was seen and examined on day of discharge. Patient was medically optimized for discharge with close outpatient follow up.    ---  PATIENT CONDITION:  - stable    --  VITALS:   T(C): 36.6 (09-10-23 @ 05:17), Max: 37.1 (09-09-23 @ 11:45)  HR: 79 (09-10-23 @ 05:17) (77 - 88)  BP: 125/85 (09-10-23 @ 05:17) (104/69 - 125/85)  RR: 18 (09-10-23 @ 05:17) (18 - 18)  SpO2: 97% (09-10-23 @ 05:17) (97% - 97%)    PHYSICAL EXAM ON DAY OF DISCHARGE:  GENERAL: patient appears well, no acute distress, appropriate, pleasant  EYES: sclera clear, no exudates  ENMT: oropharynx clear without erythema, no exudates, moist mucous membranes, + lacerations on lateral edges of both sides of the tongue - not bleeding or erythematous  NECK: supple, soft  LUNGS: good air entry bilaterally, clear to auscultation, symmetric breath sounds, no wheezing or rhonchi appreciated  HEART: soft S1/S2, regular rate and rhythm, no murmurs noted, no lower extremity edema  GASTROINTESTINAL: abdomen is soft, nontender, nondistended, normoactive bowel sounds, no palpable masses  INTEGUMENT: good skin turgor, no lesions noted  MUSCULOSKELETAL:  R arm in sling with clean dressings in place over R shoulder. Unable to assess ROM  NEUROLOGIC: awake, alert, oriented x3, no obvious sensory deficits  PSYCHIATRIC: mood is good, affect is congruent, linear and logical thought process  HEME/LYMPH: no palpable supraclavicular nodules  ---  CONSULTANTS:   Neurology - Dr. Brown  Addiction Med - Dr. Bolaños    ---  ADVANCED CARE PLANNING:  - Code status: Full Code   - MOLST completed:      [ x ] NO     [  ] YES    ---  TIME SPENT:  I, the attending physician, was physically present for the key portions of the evaluation and management (E/M) service provided. The total amount of time spent reviewing the hospital notes, laboratory values, imaging findings, assessing/counseling the patient, discussing with consultant physicians, social work, nursing staff was -- minutes       ADMISSION DATE:  09-08-23    ---  FROM ADMISSION H+P:   HPI:  46 y/o pmhx seizure disorder, anxiety/depression presents with seizure. Patient had a shoulder surgery done last week on her right shoulder at [facility] and was discharged 8/31 on oxycodone. Per mother she has been vomiting after taking this medication since 9/5 and believes Lizet has been vomiting up her ant-seizure meds. Patient states she feels she had a seizure last night, as she woke up with a tongue laceration. Per mother, daughter was saying abnormal things this morning and then had a ~2 minute long tonic-clonic seizure, after which she was brought to the ED. The patient admits to a slight headache, intermittent nausea and vomiting, but denies other symptoms such as chest pain, palpitations, shortness of breath, abdominal pain, fevers chills, diarrhea. Patient states she has been having seizures since ~age 38, last seizure ~2 years ago.    ed:  vitals - bp 110/82, rr 16, hr 101, t97.6, spo2 96  labs - wbc 10.8, hgb 10.1, plt 539, k 3.4  imaging: CTAP: No bowel obstruction or grossly thickened bowel wall. Appendix within   normal limits. Small hiatal hernia, unchanged.    2.0 cm cystic structure in the left adnexa, likely representing a left   ovarian cyst.  CTH: No acute intracranial hemorrhage, vasogenic edema or extra-axial   collection. Ventriculomegaly.  EKG - nsr 72  meds given - toradol, vimpat, zofran, tylenol, bolus   (08 Sep 2023 20:41)      ---  HOSPITAL COURSE/PERTINENT LABS/PROCEDURES PERFORMED/PENDING TESTS:   Pt was admitted for management of seizure. Neurology was consulted. Pt started home seizure meds, but IV form due to vomiting. Once she tolerated PO, transitioned back to her PO dosage. Of note, Pt reports taking Xanax 2mg TID, but as per mother, she had 120 pills refilled on 8/23 and found it missing. Addiction medicine was consulted. Pt denies these claims and states she hides them around the house and reports only taking 2mg TID consistently. Resumed on this regimen to prevent withdrawal. Patient also has Hx of multiple R shoulder dislocation, previously done on 8/31. XR of R shoulder did not reveal dislocation. Pain was managed with tramadol, which patient tolerated well. She is was instructed to follow up outpatient with her neurologist for safe benzo taper. She was made aware of the risks of withdrawal.       Patient is stable for discharge as per primary medical team and consultants.      Patient showed improvement throughout hospitalization. Patient was seen and examined on day of discharge. Patient was medically optimized for discharge with close outpatient follow up.    ---  PATIENT CONDITION:  - stable    --  VITALS:   T(C): 36.6 (09-10-23 @ 05:17), Max: 37.1 (09-09-23 @ 11:45)  HR: 79 (09-10-23 @ 05:17) (77 - 88)  BP: 125/85 (09-10-23 @ 05:17) (104/69 - 125/85)  RR: 18 (09-10-23 @ 05:17) (18 - 18)  SpO2: 97% (09-10-23 @ 05:17) (97% - 97%)    PHYSICAL EXAM ON DAY OF DISCHARGE:  GENERAL: patient appears well, no acute distress, appropriate, pleasant  EYES: sclera clear, no exudates  ENMT: oropharynx clear without erythema, no exudates, moist mucous membranes, + lacerations on lateral edges of both sides of the tongue - not bleeding or erythematous  NECK: supple, soft  LUNGS: good air entry bilaterally, clear to auscultation, symmetric breath sounds, no wheezing or rhonchi appreciated  HEART: soft S1/S2, regular rate and rhythm, no murmurs noted, no lower extremity edema  GASTROINTESTINAL: abdomen is soft, nontender, nondistended, normoactive bowel sounds, no palpable masses  INTEGUMENT: good skin turgor, no lesions noted  MUSCULOSKELETAL:  R arm in sling with clean dressings in place over R shoulder. Unable to assess ROM  NEUROLOGIC: awake, alert, oriented x3, no obvious sensory deficits  PSYCHIATRIC: mood is good, affect is congruent, linear and logical thought process  HEME/LYMPH: no palpable supraclavicular nodules  ---  CONSULTANTS:   Neurology - Dr. Brown  Addiction Med - Dr. Bolaños    ---  ADVANCED CARE PLANNING:  - Code status: Full Code   - MOLST completed:      [ x ] NO     [  ] YES

## 2023-09-10 NOTE — DISCHARGE NOTE PROVIDER - ATTENDING DISCHARGE PHYSICAL EXAMINATION:
Vital Signs Last 24 Hrs  T(C): 36.6 (10 Sep 2023 05:17), Max: 37.1 (09 Sep 2023 11:45)  T(F): 97.9 (10 Sep 2023 05:17), Max: 98.8 (09 Sep 2023 11:45)  HR: 79 (10 Sep 2023 05:17) (77 - 88)  BP: 125/85 (10 Sep 2023 05:17) (104/69 - 125/85)  BP(mean): --  RR: 18 (10 Sep 2023 05:17) (18 - 18)  SpO2: 97% (10 Sep 2023 05:17) (97% - 97%)    Parameters below as of 10 Sep 2023 05:17  Patient On (Oxygen Delivery Method): room air    PHYSICAL EXAM:  GENERAL: NAD, Pt resting comfortably in bed, R arm sling in place, pleasant  HEENT:  + lacerations on lateral edges of both sides of the tongue - not bleeding or erythematous, anicteric, moist mucous membranes  CHEST/LUNG:  CTA b/l, no rales, wheezes, or rhonchi  HEART:  RRR, S1, S2  ABDOMEN:  BS+, soft, nontender, nondistended  MUSCULOSKELETAL: R arm in sling with clean dressings in place over R shoulder. Unable to assess ROM or sensation. no edema, cyanosis, or calf tenderness  NEUROLOGIC: answers questions and follows commands appropriately

## 2023-09-10 NOTE — DISCHARGE NOTE PROVIDER - NSDCFUADDAPPT_GEN_ALL_CORE_FT
Please follow up with your neurologist for seizure medication and Xanax management.  Please also follow up with your orthopedist regarding your recent surgery.

## 2023-09-10 NOTE — CONSULT NOTE ADULT - SUBJECTIVE AND OBJECTIVE BOX
Date/Time Patient Seen:  		  Referring MD:   Data Reviewed	       Patient is a 47y old  Female who presents with a chief complaint of seizures (09 Sep 2023 14:20)      Subjective/HPI   History of Present Illness:   46 y/o pmhx seizure disorder, anxiety/depression presents with seizure. Patient had a shoulder surgery done last week on her right shoulder at [facility] and was discharged 8/31 on oxycodone. Per mother she has been vomiting after taking this medication since 9/5 and believes Lizet has been vomiting up her ant-seizure meds. Patient states she feels she had a seizure last night, as she woke up with a tongue laceration. Per mother, daughter was saying abnormal things this morning and then had a ~2 minute long tonic-clonic seizure, after which she was brought to the ED. The patient admits to a slight headache, intermittent nausea and vomiting, but denies other symptoms such as chest pain, palpitations, shortness of breath, abdominal pain, fevers chills, diarrhea. Patient states she has been having seizures since ~age 38, last seizure ~2 years ago.    PAST MEDICAL & SURGICAL HISTORY:  Rosacea    Anxiety    Panic attacks    Fibromyalgia    Seizures    Ankle fracture  left, 2016    H/O fall  6/14/19    Large bowel obstruction  2014, post op    Anxiety and depression    ETOH abuse  Pt denies    Seizures    H/O total hysterectomy    History of tonsillectomy    History of rhinoplasty    H/O exploratory laparotomy    History of open reduction and internal fixation (ORIF) procedure  left ankle    Elective surgery  laparoscopy adhesions removed- sept 2020    Elective surgery  screws removed from right shoulder oct 2020    H/O total shoulder replacement, right  PAST SURGICAL HISTORY:  Elective surgery laparoscopy adhesions removed- sept 2020    Elective surgery screws removed from right shoulder oct 2020    H/O exploratory laparotomy     H/O total hysterectomy     H/O total shoulder replacement, right     History of open reduction and internal fixation (ORIF) procedure left ankle    History of rhinoplasty     History of tonsillectomy.     FAMILY HISTORY:  No pertinent family history in first degree relatives. No pertinent family history of: seizure disorder.     Social History:  · Substance use	No  · Social History (marital status, living situation, occupation, and sexual history)	denies tobacco  drinks alcohol occasionally  denies drug use    lives with mother  fully functional with adls     Tobacco Screening:  · Core Measure Site	Yes  · Has the patient used tobacco in the past 30 days?	No    Risk Assessment:    Present on Admission:  Deep Venous Thrombosis	no  Pulmonary Embolus	no     HIV Screening:  · In accordance with [place] State law, we offer every patient who comes to our ED an HIV test. Would you like to be tested today?	Opt out          Medication list         MEDICATIONS  (STANDING):  ALPRAZolam 2 milliGRAM(s) Oral three times a day  citalopram 20 milliGRAM(s) Oral daily  enoxaparin Injectable 40 milliGRAM(s) SubCutaneous every 24 hours  eslicarbazepine 800 milliGRAM(s) Oral daily  influenza   Vaccine 0.5 milliLiter(s) IntraMuscular once  lacosamide 200 milliGRAM(s) Oral two times a day  pantoprazole    Tablet 40 milliGRAM(s) Oral before breakfast  polyethylene glycol 3350 17 Gram(s) Oral daily  senna 2 Tablet(s) Oral at bedtime    MEDICATIONS  (PRN):  acetaminophen     Tablet .. 650 milliGRAM(s) Oral every 6 hours PRN Temp greater or equal to 38C (100.4F), Mild Pain (1 - 3)  aluminum hydroxide/magnesium hydroxide/simethicone Suspension 30 milliLiter(s) Oral every 4 hours PRN Dyspepsia  melatonin 3 milliGRAM(s) Oral at bedtime PRN Insomnia  ondansetron Injectable 4 milliGRAM(s) IV Push every 8 hours PRN Nausea and/or Vomiting  traMADol 25 milliGRAM(s) Oral every 6 hours PRN Moderate Pain (4 - 6)  traMADol 50 milliGRAM(s) Oral every 6 hours PRN Severe Pain (7 - 10)         Vitals log        ICU Vital Signs Last 24 Hrs  T(C): 36.6 (10 Sep 2023 05:17), Max: 37.1 (09 Sep 2023 11:45)  T(F): 97.9 (10 Sep 2023 05:17), Max: 98.8 (09 Sep 2023 11:45)  HR: 79 (10 Sep 2023 05:17) (77 - 88)  BP: 125/85 (10 Sep 2023 05:17) (104/69 - 125/85)  BP(mean): --  ABP: --  ABP(mean): --  RR: 18 (10 Sep 2023 05:17) (18 - 18)  SpO2: 97% (10 Sep 2023 05:17) (97% - 97%)    O2 Parameters below as of 10 Sep 2023 05:17  Patient On (Oxygen Delivery Method): room air                 Input and Output:  I&O's Detail      Lab Data                        8.5    7.02  )-----------( 353      ( 09 Sep 2023 04:56 )             27.7     09-09    139  |  109<H>  |  9   ----------------------------<  84  3.4<L>   |  23  |  0.68    Ca    8.7      09 Sep 2023 04:56  Phos  2.2     09-08  Mg     2.3     09-08    TPro  6.6  /  Alb  3.1<L>  /  TBili  0.3  /  DBili  x   /  AST  21  /  ALT  11<L>  /  AlkPhos  66  09-09            Review of Systems	      Objective     Physical Examination        Pertinent Lab findings & Imaging      [place]:  NO   Adequate UO     I&O's Detail           Discussed with:     Cultures:	        Radiology      ACC: [id number] EXAM:  CT ABDOMEN AND PELVIS IC   ORDERED BY:  RAMIRO REED     PROCEDURE DATE:  09/08/2023          INTERPRETATION:  CLINICAL INFORMATION: Persistent vomiting    COMPARISON: 10/6/2021    CONTRAST/COMPLICATIONS:  IV Contrast: Omnipaque 350  90 cc administered   10 cc discarded  Oral Contrast: NONE  Complications: None reported at time of study completion    PROCEDURE:  CT of the Abdomen and Pelvis was performed.  Sagittal and coronal reformats were performed.    FINDINGS:  LOWER CHEST: Small right atelectasis.    LIVER: Stable small hypodense lesion in the left hepatic lobe, too small   to characterize.  BILE DUCTS: Normal caliber.  GALLBLADDER: Within normal limits.  SPLEEN: Within normal limits.  PANCREAS: Within normal limits.  ADRENALS: Within normal limits.  KIDNEYS/URETERS: Within normal limits. No evidence for a ureteral   calculus. No hydronephrosis.    BLADDER: Within normal limits.  REPRODUCTIVE ORGANS: The uterus is not visualized, likely surgically   absent. 2.0 cm cystic structure in the left adnexa, likely representing a   left ovarian cyst. The right adnexa appears grossly unremarkable.    BOWEL: No bowel obstruction or grossly thickened bowel wall. Appendix   within normal limits.. Small hiatal hernia, unchanged.  PERITONEUM: No free air or ascites.  VESSELS: Within normal limits.  RETROPERITONEUM/LYMPH NODES: No lymphadenopathy.  ABDOMINAL WALL: Small fat-containing umbilical hernia.  BONES: Within normal limits.    IMPRESSION:  No bowel obstruction or grossly thickened bowel wall. Appendix within   normal limits. Small hiatal hernia, unchanged.    2.0 cm cystic structure in the left adnexa, likely representing a left   ovarian cyst.    --- End of Report ---            TERRIE KAUR MD; Attending Radiologist  This document has been electronically signed. Sep  8 2023  8:14PM                         Date/Time Patient Seen:  		  Referring MD:   Data Reviewed	       Patient is a 47y old  Female who presents with a chief complaint of seizures (09 Sep 2023 14:20)      Subjective/HPI  in bed  seen and examined  vs noted  labs reviewed  imaging reviewed  alert  verbal  oriented     History of Present Illness:   46 y/o pmhx seizure disorder, anxiety/depression presents with seizure. Patient had a shoulder surgery done last week on her right shoulder at [facility] and was discharged 8/31 on oxycodone. Per mother she has been vomiting after taking this medication since 9/5 and believes Lizet has been vomiting up her ant-seizure meds. Patient states she feels she had a seizure last night, as she woke up with a tongue laceration. Per mother, daughter was saying abnormal things this morning and then had a ~2 minute long tonic-clonic seizure, after which she was brought to the ED. The patient admits to a slight headache, intermittent nausea and vomiting, but denies other symptoms such as chest pain, palpitations, shortness of breath, abdominal pain, fevers chills, diarrhea. Patient states she has been having seizures since ~age 38, last seizure ~2 years ago.    PAST MEDICAL & SURGICAL HISTORY:  Rosacea    Anxiety    Panic attacks    Fibromyalgia    Seizures    Ankle fracture  left, 2016    H/O fall  6/14/19    Large bowel obstruction  2014, post op    Anxiety and depression    ETOH abuse  Pt denies    Seizures    H/O total hysterectomy    History of tonsillectomy    History of rhinoplasty    H/O exploratory laparotomy    History of open reduction and internal fixation (ORIF) procedure  left ankle    Elective surgery  laparoscopy adhesions removed- sept 2020    Elective surgery  screws removed from right shoulder oct 2020    H/O total shoulder replacement, right  PAST SURGICAL HISTORY:  Elective surgery laparoscopy adhesions removed- sept 2020    Elective surgery screws removed from right shoulder oct 2020    H/O exploratory laparotomy     H/O total hysterectomy     H/O total shoulder replacement, right     History of open reduction and internal fixation (ORIF) procedure left ankle    History of rhinoplasty     History of tonsillectomy.     FAMILY HISTORY:  No pertinent family history in first degree relatives. No pertinent family history of: seizure disorder.     Social History:  · Substance use	No  · Social History (marital status, living situation, occupation, and sexual history)	denies tobacco  drinks alcohol occasionally  denies drug use    lives with mother  fully functional with adls     Tobacco Screening:  · Core Measure Site	Yes  · Has the patient used tobacco in the past 30 days?	No    Risk Assessment:    Present on Admission:  Deep Venous Thrombosis	no  Pulmonary Embolus	no     HIV Screening:  · In accordance with [place] State law, we offer every patient who comes to our ED an HIV test. Would you like to be tested today?	Opt out          Medication list         MEDICATIONS  (STANDING):  ALPRAZolam 2 milliGRAM(s) Oral three times a day  citalopram 20 milliGRAM(s) Oral daily  enoxaparin Injectable 40 milliGRAM(s) SubCutaneous every 24 hours  eslicarbazepine 800 milliGRAM(s) Oral daily  influenza   Vaccine 0.5 milliLiter(s) IntraMuscular once  lacosamide 200 milliGRAM(s) Oral two times a day  pantoprazole    Tablet 40 milliGRAM(s) Oral before breakfast  polyethylene glycol 3350 17 Gram(s) Oral daily  senna 2 Tablet(s) Oral at bedtime    MEDICATIONS  (PRN):  acetaminophen     Tablet .. 650 milliGRAM(s) Oral every 6 hours PRN Temp greater or equal to 38C (100.4F), Mild Pain (1 - 3)  aluminum hydroxide/magnesium hydroxide/simethicone Suspension 30 milliLiter(s) Oral every 4 hours PRN Dyspepsia  melatonin 3 milliGRAM(s) Oral at bedtime PRN Insomnia  ondansetron Injectable 4 milliGRAM(s) IV Push every 8 hours PRN Nausea and/or Vomiting  traMADol 25 milliGRAM(s) Oral every 6 hours PRN Moderate Pain (4 - 6)  traMADol 50 milliGRAM(s) Oral every 6 hours PRN Severe Pain (7 - 10)         Vitals log        ICU Vital Signs Last 24 Hrs  T(C): 36.6 (10 Sep 2023 05:17), Max: 37.1 (09 Sep 2023 11:45)  T(F): 97.9 (10 Sep 2023 05:17), Max: 98.8 (09 Sep 2023 11:45)  HR: 79 (10 Sep 2023 05:17) (77 - 88)  BP: 125/85 (10 Sep 2023 05:17) (104/69 - 125/85)  BP(mean): --  ABP: --  ABP(mean): --  RR: 18 (10 Sep 2023 05:17) (18 - 18)  SpO2: 97% (10 Sep 2023 05:17) (97% - 97%)    O2 Parameters below as of 10 Sep 2023 05:17  Patient On (Oxygen Delivery Method): room air                 Input and Output:  I&O's Detail      Lab Data                        8.5    7.02  )-----------( 353      ( 09 Sep 2023 04:56 )             27.7     09-09    139  |  109<H>  |  9   ----------------------------<  84  3.4<L>   |  23  |  0.68    Ca    8.7      09 Sep 2023 04:56  Phos  2.2     09-08  Mg     2.3     09-08    TPro  6.6  /  Alb  3.1<L>  /  TBili  0.3  /  DBili  x   /  AST  21  /  ALT  11<L>  /  AlkPhos  66  09-09            Review of Systems	  anxious      Objective     Physical Examination    heart s1s2  lung dc BS  head nc  verbal  alert  oriented      Pertinent Lab findings & Imaging      [place]:  NO   Adequate UO     I&O's Detail           Discussed with:     Cultures:	        Radiology      ACC: [id number] EXAM:  CT ABDOMEN AND PELVIS IC   ORDERED BY:  RAMIRO REED     PROCEDURE DATE:  09/08/2023          INTERPRETATION:  CLINICAL INFORMATION: Persistent vomiting    COMPARISON: 10/6/2021    CONTRAST/COMPLICATIONS:  IV Contrast: Omnipaque 350  90 cc administered   10 cc discarded  Oral Contrast: NONE  Complications: None reported at time of study completion    PROCEDURE:  CT of the Abdomen and Pelvis was performed.  Sagittal and coronal reformats were performed.    FINDINGS:  LOWER CHEST: Small right atelectasis.    LIVER: Stable small hypodense lesion in the left hepatic lobe, too small   to characterize.  BILE DUCTS: Normal caliber.  GALLBLADDER: Within normal limits.  SPLEEN: Within normal limits.  PANCREAS: Within normal limits.  ADRENALS: Within normal limits.  KIDNEYS/URETERS: Within normal limits. No evidence for a ureteral   calculus. No hydronephrosis.    BLADDER: Within normal limits.  REPRODUCTIVE ORGANS: The uterus is not visualized, likely surgically   absent. 2.0 cm cystic structure in the left adnexa, likely representing a   left ovarian cyst. The right adnexa appears grossly unremarkable.    BOWEL: No bowel obstruction or grossly thickened bowel wall. Appendix   within normal limits.. Small hiatal hernia, unchanged.  PERITONEUM: No free air or ascites.  VESSELS: Within normal limits.  RETROPERITONEUM/LYMPH NODES: No lymphadenopathy.  ABDOMINAL WALL: Small fat-containing umbilical hernia.  BONES: Within normal limits.    IMPRESSION:  No bowel obstruction or grossly thickened bowel wall. Appendix within   normal limits. Small hiatal hernia, unchanged.    2.0 cm cystic structure in the left adnexa, likely representing a left   ovarian cyst.    --- End of Report ---            TERRIE KAUR MD; Attending Radiologist  This document has been electronically signed. Sep  8 2023  8:14PM

## 2024-01-16 NOTE — PATIENT PROFILE ADULT - NSPROPOAPRESSUREINJURY_GEN_A_NUR
Care Transitions Outreach Attempt    Call within 2 business days of discharge: Yes   Attempted to reach patient for transitions of care follow up. Unable to reach patient. CTN left HIPAA compliant message requesting return call.       CTN routed message to PCP Clinical Staff/ Staff to schedule Hosp F/U.    Patient: Tristan Cedillo Patient : 1972 MRN: <[id number]>  Reason for Admission: READMIT CAROLYNE -24 Hyponatremia  Discharge Date: 24 RARS: Readmission Risk Score: 13.7    Last Discharge Facility       Date Complaint Diagnosis Description Type Department Provider    24 Abdominal Pain Hyponatremia ... ED to Hosp-Admission (Discharged) (ADMITTED) SJTROYZ Rony Decker, ; Latricia Burrows ...          Was this an external facility discharge? No Discharge Facility Name: n/a    Noted following upcoming appointments from discharge chart review:   [facility] follow up appointment(s): No future appointments.  Non-[facility]  follow up appointment(s):     Loretta Hidalgo LPN  
no

## 2024-02-12 NOTE — H&P ADULT - PROBLEM SELECTOR PROBLEM 5
Anxiety and depression
MAHIN Webb- discussed all results , acute tonsillitis, script sent to pharmacy.

## 2024-02-19 NOTE — OCCUPATIONAL THERAPY INITIAL EVALUATION ADULT - BATHING, PREVIOUS LEVEL OF FUNCTION, OT EVAL
Patients daughter Nereyda called in to schedule a follow up from patients recent ED stay.    Patient had a referral to neuro, so patient was triaged by help of daughter. Triage was sent for review and is awaiting response to schedule accordingly.    Neuro psych and Central Scheduling numbers were provided to Nereyda with help with scheduling patients other needed appointments.    Please make sure to call Nereyda Patients daughter to schedule appointment, as herself and others are the ones to take patient to appointment.  
independent

## 2024-07-01 ENCOUNTER — APPOINTMENT (OUTPATIENT)
Dept: INTERNAL MEDICINE | Facility: CLINIC | Age: 49
End: 2024-07-01

## 2024-10-08 NOTE — ED ADULT NURSE NOTE - NSFALLRSKUNASSIST_ED_ALL_ED
34w0d. Patient calling regarding RSV vaccine. Patient called insurance, needs name of medication and CPT code. Information provided to patient. She will call insurance to check if covered.   
Patient has questions regarding RSV vaccine. Please call.  
no

## 2024-12-03 NOTE — ASU PATIENT PROFILE, ADULT - NS PRO LAST MENSTRUAL
Reviewed refill protocol and patient chart. Refill amLODIPine (NORVASC) 5 MG tablet   approved and processed.     Next office visit due: 1/24/25.     Reviewed refill protocol and patient chart. buPROPion XL (WELLBUTRIN XL) 150 MG 24 hr tablet Refill denied for the following reason(s): patient has current refills on file . 1 year supply given 11/19/24    Next office visit due: 1/24/25    Over 5 years ago. Hysterectomy

## 2025-07-25 ENCOUNTER — EMERGENCY (EMERGENCY)
Facility: HOSPITAL | Age: 50
LOS: 1 days | End: 2025-07-25
Attending: EMERGENCY MEDICINE | Admitting: EMERGENCY MEDICINE
Payer: MEDICARE

## 2025-07-25 VITALS
RESPIRATION RATE: 18 BRPM | OXYGEN SATURATION: 96 % | SYSTOLIC BLOOD PRESSURE: 106 MMHG | TEMPERATURE: 98 F | HEART RATE: 96 BPM | DIASTOLIC BLOOD PRESSURE: 69 MMHG

## 2025-07-25 VITALS
DIASTOLIC BLOOD PRESSURE: 85 MMHG | SYSTOLIC BLOOD PRESSURE: 128 MMHG | HEIGHT: 67 IN | HEART RATE: 74 BPM | WEIGHT: 192.02 LBS | OXYGEN SATURATION: 96 % | RESPIRATION RATE: 16 BRPM | TEMPERATURE: 98 F

## 2025-07-25 DIAGNOSIS — Z41.9 ENCOUNTER FOR PROCEDURE FOR PURPOSES OTHER THAN REMEDYING HEALTH STATE, UNSPECIFIED: Chronic | ICD-10-CM

## 2025-07-25 DIAGNOSIS — Z90.710 ACQUIRED ABSENCE OF BOTH CERVIX AND UTERUS: Chronic | ICD-10-CM

## 2025-07-25 DIAGNOSIS — Z96.611 PRESENCE OF RIGHT ARTIFICIAL SHOULDER JOINT: Chronic | ICD-10-CM

## 2025-07-25 DIAGNOSIS — Z98.890 OTHER SPECIFIED POSTPROCEDURAL STATES: Chronic | ICD-10-CM

## 2025-07-25 DIAGNOSIS — Z90.89 ACQUIRED ABSENCE OF OTHER ORGANS: Chronic | ICD-10-CM

## 2025-07-25 LAB
ALBUMIN SERPL ELPH-MCNC: 4.4 G/DL — SIGNIFICANT CHANGE UP (ref 3.3–5)
ALP SERPL-CCNC: 106 U/L — SIGNIFICANT CHANGE UP (ref 40–120)
ALT FLD-CCNC: 20 U/L — SIGNIFICANT CHANGE UP (ref 12–78)
AMPHET UR-MCNC: NEGATIVE — SIGNIFICANT CHANGE UP
ANION GAP SERPL CALC-SCNC: 11 MMOL/L — SIGNIFICANT CHANGE UP (ref 5–17)
APPEARANCE UR: CLEAR — SIGNIFICANT CHANGE UP
APTT BLD: 31.2 SEC — SIGNIFICANT CHANGE UP (ref 26.1–36.8)
AST SERPL-CCNC: 27 U/L — SIGNIFICANT CHANGE UP (ref 15–37)
BACTERIA # UR AUTO: ABNORMAL /HPF
BARBITURATES UR SCN-MCNC: NEGATIVE — SIGNIFICANT CHANGE UP
BASOPHILS # BLD AUTO: 0.03 K/UL — SIGNIFICANT CHANGE UP (ref 0–0.2)
BASOPHILS NFR BLD AUTO: 0.3 % — SIGNIFICANT CHANGE UP (ref 0–2)
BENZODIAZ UR-MCNC: POSITIVE — SIGNIFICANT CHANGE UP
BILIRUB SERPL-MCNC: 0.4 MG/DL — SIGNIFICANT CHANGE UP (ref 0.2–1.2)
BILIRUB UR-MCNC: NEGATIVE — SIGNIFICANT CHANGE UP
BUN SERPL-MCNC: 18 MG/DL — SIGNIFICANT CHANGE UP (ref 7–23)
CALCIUM SERPL-MCNC: 9.8 MG/DL — SIGNIFICANT CHANGE UP (ref 8.5–10.1)
CHLORIDE SERPL-SCNC: 108 MMOL/L — SIGNIFICANT CHANGE UP (ref 96–108)
CK MB BLD-MCNC: 1.1 % — SIGNIFICANT CHANGE UP (ref 0–3.5)
CK MB CFR SERPL CALC: 1.8 NG/ML — SIGNIFICANT CHANGE UP (ref 0–3.6)
CK SERPL-CCNC: 169 U/L — SIGNIFICANT CHANGE UP (ref 26–192)
CO2 SERPL-SCNC: 19 MMOL/L — LOW (ref 22–31)
COCAINE METAB.OTHER UR-MCNC: NEGATIVE — SIGNIFICANT CHANGE UP
COLOR SPEC: YELLOW — SIGNIFICANT CHANGE UP
CREAT SERPL-MCNC: 0.84 MG/DL — SIGNIFICANT CHANGE UP (ref 0.5–1.3)
DIFF PNL FLD: NEGATIVE — SIGNIFICANT CHANGE UP
EGFR: 84 ML/MIN/1.73M2 — SIGNIFICANT CHANGE UP
EGFR: 84 ML/MIN/1.73M2 — SIGNIFICANT CHANGE UP
EOSINOPHIL # BLD AUTO: 0.01 K/UL — SIGNIFICANT CHANGE UP (ref 0–0.5)
EOSINOPHIL NFR BLD AUTO: 0.1 % — SIGNIFICANT CHANGE UP (ref 0–6)
EPI CELLS # UR: PRESENT
ETHANOL SERPL-MCNC: <10 MG/DL — SIGNIFICANT CHANGE UP (ref 0–10)
GLUCOSE SERPL-MCNC: 78 MG/DL — SIGNIFICANT CHANGE UP (ref 70–99)
GLUCOSE UR QL: NEGATIVE MG/DL — SIGNIFICANT CHANGE UP
HCG SERPL-ACNC: 3 MIU/ML — SIGNIFICANT CHANGE UP
HCT VFR BLD CALC: 33.1 % — LOW (ref 34.5–45)
HGB BLD-MCNC: 9.7 G/DL — LOW (ref 11.5–15.5)
IMM GRANULOCYTES # BLD AUTO: 0.05 K/UL — SIGNIFICANT CHANGE UP (ref 0–0.07)
IMM GRANULOCYTES NFR BLD AUTO: 0.6 % — SIGNIFICANT CHANGE UP (ref 0–0.9)
INR BLD: 1.03 RATIO — SIGNIFICANT CHANGE UP (ref 0.85–1.16)
KETONES UR QL: >=160 MG/DL
LACTATE SERPL-SCNC: 2.1 MMOL/L — HIGH (ref 0.7–2)
LEUKOCYTE ESTERASE UR-ACNC: NEGATIVE — SIGNIFICANT CHANGE UP
LYMPHOCYTES # BLD AUTO: 1 K/UL — SIGNIFICANT CHANGE UP (ref 1–3.3)
LYMPHOCYTES NFR BLD AUTO: 11.4 % — LOW (ref 13–44)
MAGNESIUM SERPL-MCNC: 1.9 MG/DL — SIGNIFICANT CHANGE UP (ref 1.6–2.6)
MCHC RBC-ENTMCNC: 22.4 PG — LOW (ref 27–34)
MCHC RBC-ENTMCNC: 29.3 G/DL — LOW (ref 32–36)
MCV RBC AUTO: 76.3 FL — LOW (ref 80–100)
METHADONE UR-MCNC: NEGATIVE — SIGNIFICANT CHANGE UP
MONOCYTES # BLD AUTO: 0.5 K/UL — SIGNIFICANT CHANGE UP (ref 0–0.9)
MONOCYTES NFR BLD AUTO: 5.7 % — SIGNIFICANT CHANGE UP (ref 2–14)
NEUTROPHILS # BLD AUTO: 7.2 K/UL — SIGNIFICANT CHANGE UP (ref 1.8–7.4)
NEUTROPHILS NFR BLD AUTO: 81.9 % — HIGH (ref 43–77)
NITRITE UR-MCNC: NEGATIVE — SIGNIFICANT CHANGE UP
NRBC # BLD AUTO: 0 K/UL — SIGNIFICANT CHANGE UP (ref 0–0)
NRBC # FLD: 0 K/UL — SIGNIFICANT CHANGE UP (ref 0–0)
NRBC BLD AUTO-RTO: 0 /100 WBCS — SIGNIFICANT CHANGE UP (ref 0–0)
OPIATES UR-MCNC: NEGATIVE — SIGNIFICANT CHANGE UP
PCP SPEC-MCNC: SIGNIFICANT CHANGE UP
PCP UR-MCNC: NEGATIVE — SIGNIFICANT CHANGE UP
PH UR: 5.5 — SIGNIFICANT CHANGE UP (ref 5–8)
PLATELET # BLD AUTO: 349 K/UL — SIGNIFICANT CHANGE UP (ref 150–400)
PMV BLD: 8.9 FL — SIGNIFICANT CHANGE UP (ref 7–13)
POTASSIUM SERPL-MCNC: 4.1 MMOL/L — SIGNIFICANT CHANGE UP (ref 3.5–5.3)
POTASSIUM SERPL-SCNC: 4.1 MMOL/L — SIGNIFICANT CHANGE UP (ref 3.5–5.3)
PROT SERPL-MCNC: 8.2 G/DL — SIGNIFICANT CHANGE UP (ref 6–8.3)
PROT UR-MCNC: 30 MG/DL
PROTHROM AB SERPL-ACNC: 12.1 SEC — SIGNIFICANT CHANGE UP (ref 9.9–13.4)
RBC # BLD: 4.34 M/UL — SIGNIFICANT CHANGE UP (ref 3.8–5.2)
RBC # FLD: 17.6 % — HIGH (ref 10.3–14.5)
RBC CASTS # UR COMP ASSIST: 0 /HPF — SIGNIFICANT CHANGE UP (ref 0–4)
SODIUM SERPL-SCNC: 138 MMOL/L — SIGNIFICANT CHANGE UP (ref 135–145)
SP GR SPEC: 1.02 — SIGNIFICANT CHANGE UP (ref 1–1.03)
THC UR QL: NEGATIVE — SIGNIFICANT CHANGE UP
TROPONIN I, HIGH SENSITIVITY RESULT: <3 NG/L — SIGNIFICANT CHANGE UP
UROBILINOGEN FLD QL: 1 MG/DL — SIGNIFICANT CHANGE UP (ref 0.2–1)
WBC # BLD: 8.79 K/UL — SIGNIFICANT CHANGE UP (ref 3.8–10.5)
WBC # FLD AUTO: 8.79 K/UL — SIGNIFICANT CHANGE UP (ref 3.8–10.5)
WBC UR QL: 1 /HPF — SIGNIFICANT CHANGE UP (ref 0–5)

## 2025-07-25 PROCEDURE — 70450 CT HEAD/BRAIN W/O DYE: CPT | Mod: 26

## 2025-07-25 PROCEDURE — 99285 EMERGENCY DEPT VISIT HI MDM: CPT

## 2025-07-25 PROCEDURE — 71045 X-RAY EXAM CHEST 1 VIEW: CPT | Mod: 26

## 2025-07-25 PROCEDURE — 83735 ASSAY OF MAGNESIUM: CPT

## 2025-07-25 PROCEDURE — 99285 EMERGENCY DEPT VISIT HI MDM: CPT | Mod: 25

## 2025-07-25 PROCEDURE — 73030 X-RAY EXAM OF SHOULDER: CPT | Mod: 26,RT

## 2025-07-25 PROCEDURE — 84702 CHORIONIC GONADOTROPIN TEST: CPT

## 2025-07-25 PROCEDURE — 80053 COMPREHEN METABOLIC PANEL: CPT

## 2025-07-25 PROCEDURE — 36415 COLL VENOUS BLD VENIPUNCTURE: CPT

## 2025-07-25 PROCEDURE — 70450 CT HEAD/BRAIN W/O DYE: CPT

## 2025-07-25 PROCEDURE — 82553 CREATINE MB FRACTION: CPT

## 2025-07-25 PROCEDURE — 70486 CT MAXILLOFACIAL W/O DYE: CPT | Mod: 26

## 2025-07-25 PROCEDURE — 85730 THROMBOPLASTIN TIME PARTIAL: CPT

## 2025-07-25 PROCEDURE — 72125 CT NECK SPINE W/O DYE: CPT

## 2025-07-25 PROCEDURE — 93005 ELECTROCARDIOGRAM TRACING: CPT

## 2025-07-25 PROCEDURE — 81001 URINALYSIS AUTO W/SCOPE: CPT

## 2025-07-25 PROCEDURE — 80177 DRUG SCRN QUAN LEVETIRACETAM: CPT

## 2025-07-25 PROCEDURE — 84484 ASSAY OF TROPONIN QUANT: CPT

## 2025-07-25 PROCEDURE — 73030 X-RAY EXAM OF SHOULDER: CPT

## 2025-07-25 PROCEDURE — 96374 THER/PROPH/DIAG INJ IV PUSH: CPT

## 2025-07-25 PROCEDURE — 70486 CT MAXILLOFACIAL W/O DYE: CPT

## 2025-07-25 PROCEDURE — 83605 ASSAY OF LACTIC ACID: CPT

## 2025-07-25 PROCEDURE — 71045 X-RAY EXAM CHEST 1 VIEW: CPT

## 2025-07-25 PROCEDURE — 72125 CT NECK SPINE W/O DYE: CPT | Mod: 26

## 2025-07-25 PROCEDURE — 82550 ASSAY OF CK (CPK): CPT

## 2025-07-25 PROCEDURE — 82962 GLUCOSE BLOOD TEST: CPT

## 2025-07-25 PROCEDURE — 85025 COMPLETE CBC W/AUTO DIFF WBC: CPT

## 2025-07-25 PROCEDURE — 80307 DRUG TEST PRSMV CHEM ANLYZR: CPT

## 2025-07-25 PROCEDURE — 93010 ELECTROCARDIOGRAM REPORT: CPT

## 2025-07-25 PROCEDURE — 85610 PROTHROMBIN TIME: CPT

## 2025-07-25 RX ORDER — LEVETIRACETAM 10 MG/ML
1000 INJECTION, SOLUTION INTRAVENOUS ONCE
Refills: 0 | Status: DISCONTINUED | OUTPATIENT
Start: 2025-07-25 | End: 2025-07-25

## 2025-07-25 RX ORDER — ALPRAZOLAM 0.5 MG
0.5 TABLET, EXTENDED RELEASE 24 HR ORAL ONCE
Refills: 0 | Status: DISCONTINUED | OUTPATIENT
Start: 2025-07-25 | End: 2025-07-25

## 2025-07-25 RX ADMIN — Medication 1000 MILLILITER(S): at 13:36

## 2025-07-25 RX ADMIN — LEVETIRACETAM 1000 MILLIGRAM(S): 10 INJECTION, SOLUTION INTRAVENOUS at 13:36

## 2025-07-25 RX ADMIN — Medication 0.5 MILLIGRAM(S): at 13:36

## 2025-07-25 NOTE — ED ADULT NURSE REASSESSMENT NOTE - NSFALLHARMRISKINTERV_ED_ALL_ED

## 2025-07-25 NOTE — ED PROVIDER NOTE - OBJECTIVE STATEMENT
49-year-old female PMH of seizure disorder, anxiety on Vimpat, Keppra, Xanax Presents to the emergency department by ambulance with report of seizure, patient does not recall what happened, states that she feels jittery.

## 2025-07-25 NOTE — ED PROVIDER NOTE - PROGRESS NOTE DETAILS
spoke with spoke with father Wyatt [phone] States that yesterday the patient had 2 tonic-clonic seizures, he had wanted her to come in yesterday but the patient did not want to, today patient acting bizarre, walking around the house naked, and not making sense, unclear if patient had a seizure today, noted patient to have abrasion on her head and called for ambulance.

## 2025-07-25 NOTE — ED PROVIDER NOTE - PATIENT PORTAL LINK FT
You can access the FollowMyHealth Patient Portal offered by [facility] by registering at the following website: [email or web address]. By joining [facility]’s FollowMyHealth portal, you will also be able to view your health information using other applications (apps) compatible with our system.

## 2025-07-25 NOTE — ED ADULT NURSE REASSESSMENT NOTE - NS ED NURSE REASSESS COMMENT FT1
DC instructions reviewed at bedside with pt and pts father, pt able to verbalize understanding of information. All questions answered as asked. Pt denies any additional needs at this time. Pt pending dc home

## 2025-07-25 NOTE — ED PROVIDER NOTE - DIFFERENTIAL DIAGNOSIS
Differential Diagnosis Seizure, intracranial pathology, noncompliance, electrolyte abnormality, trauma

## 2025-07-25 NOTE — ED PROVIDER NOTE - CLINICAL SUMMARY MEDICAL DECISION MAKING FREE TEXT BOX
49 female with history of seizures, altered mental status, will follow-up CT head, CT maxillofacial, CT cervical spine, chest x-ray, right shoulder x-ray, CBC, CMP, Keppra level, will administer Keppra IV, patient is on Xanax, patient states she feels jittery, will give dose of Xanax, IV fluids and reevaluate.

## 2025-07-25 NOTE — ED PROVIDER NOTE - CARE PROVIDER_API CALL
Lucero Brown ()  Neurology  [address] [place] [id number]  Phone: [phone]  Fax: [phone]  Follow Up Time:

## 2025-07-25 NOTE — ED PROVIDER NOTE - NSFOLLOWUPINSTRUCTIONS_ED_ALL_ED_FT
Seizure, Adult  A seizure is a sudden burst of abnormal activity in the brain. Seizures usually last from 30 seconds to 2 minutes.    There are many types of seizures. And they can cause many different symptoms.    What are the causes?  Common causes of a seizure include:  Fever or infection.  Problems that affect the brain. These may include:  A brain or head injury.  A stroke.  A brain tumor.  Low levels of blood sugar or salt.  Kidney problems or liver problems.  Some inherited conditions. These are passed down from parent to child.  Problems with a substance, such as:  Having a reaction to a drug or a medicine.  Stopping the use of a substance all of a sudden. When this causes problems, it's called withdrawal.  Disorders that affect how you develop, such as autism spectrum disorder or cerebral palsy.  Sometimes, the cause may not be known. Some people who have a seizure never have another one. A person who has repeated seizures over time without a clear cause has a condition called epilepsy.    What increases the risk?  Having a family history of epilepsy.  Having had a tonic–clonic seizure before. This type of seizure causes:  The muscles of the whole body to tighten, or contract.  Loss of consciousness.  Having a head injury or a stroke in the past.  Having had too little oxygen at birth.  What are the signs or symptoms?  The symptoms vary depending on the type of seizure you have.    Symptoms during a seizure    Having convulsions. This means shaking with fast, jerky movements of muscles.  Stiffness of the body.  Breathing problems.  Being confused.  Staring or not responding to sound or touch.  Head nodding, eye blinking, eye twitching, or fast eye movements.  Drooling, grunting, or making clicking sounds with your mouth.  Losing control of when you pee or poop.  Symptoms before a seizure    Feeling afraid, worried, or nervous.  Feeling like you may vomit.  Vertigo. This feels like:  You are moving when you're not.  Things around you are moving when they're not.  Déjà vu. This is a feeling of having seen or heard something before.  Odd tastes or smells.  Changes in how you see. You may see flashing lights or spots.  Symptoms after a seizure    Being confused.  Feeling sleepy.  Headache.  Sore muscles.  How is this diagnosed?  A seizure may be diagnosed based on:  A description of your symptoms. Video of your seizures can be helpful.  Your medical history.  A physical exam.  Tests, such as:  Blood tests.  CT scan.  MRI.  Electroencephalogram, or EEG. This test measures electrical activity in the brain.  A test of your spinal fluid. This is called a spinal tap or lumbar puncture.  How is this treated?  If your seizure stops on its own, you will not need treatment. If your seizure lasts longer than 5 minutes, you'll normally need treatment. This may include:  Medicines given through an IV.  Avoiding things, such as medicines, that are known to cause your seizures.  Medicines to prevent seizures. These are called antiepileptics.  A device to prevent or control seizures.  Eating foods that are low in carbohydrates and high in fat (ketogenic diet).  Surgery. This is sometimes needed if you keep having seizures.  Follow these instructions at home:  Medicines    Take your medicines only as told by your health care provider.  Avoid anything that may keep your medicine from working, such as alcohol.  Activity    Follow your provider's advice about driving, swimming, and doing other things that would be dangerous if you had a seizure. Wait until your provider says it's safe for you to do these things.  If you live in the [place] ask your local department of [employer] vehicles ([employer]) when you can drive.  Get enough rest and sleep. Not getting enough sleep can make seizures more likely to happen.  Teaching others    A person helping someone who is on the ground having a seizure. The helper carefully turns the person onto their side.  Teach friends and family what to do if you have a seizure. Tell them to:  Help you get down to the ground safely.  Protect your head and body.  Loosen any clothing around your neck.  Turn you on your side. This helps keep your airway clear if you vomit.  Know whether or not you need emergency care.  Stay with you until you are better.  Also, tell them what not to do if you have a seizure. Tell them:  They should not hold you down.  They should not put anything in your mouth.  General instructions    Avoid anything that has caused you to have seizures.  Keep a seizure diary. Write down:  What you remember about each seizure.  What you think might have caused each seizure.  Keep all follow-up visits. Your provider may need to monitor your progress.  Contact a health care provider if:  You have another seizure or seizures. Call each time you have a seizure.  You have a change in how often or when you have seizures.  You keep having seizures with treatment.  You have symptoms of being sick or having an infection.  You are not able to take your medicine.  Get help right away if:  You have or someone has seen you have:  A seizure that lasts longer than 5 minutes.  Many seizures in a row and you don't feel better between seizures.  A seizure that makes it harder to breathe.  A seizure that leaves you unable to speak or use a part of your body.  You didn't wake up right away after a seizure.  You injure yourself during a seizure.  You have confusion or pain right after a seizure.  These symptoms may be an emergency. Call 911 right away.  Do not wait to see if the symptoms will go away.  Do not drive yourself to the hospital.  This information is not intended to replace advice given to you by your health care provider. Make sure you discuss any questions you have with your health care provider.    Document Revised: 09/19/2024 Document Reviewed: 01/31/2024  [employer] © 2025 [employer]

## 2025-07-29 LAB — LEVETIRACETAM SERPL-MCNC: <2 UG/ML — LOW (ref 10–40)

## 2025-07-30 ENCOUNTER — APPOINTMENT (OUTPATIENT)
Facility: CLINIC | Age: 50
End: 2025-07-30
Payer: MEDICARE

## 2025-07-30 VITALS
DIASTOLIC BLOOD PRESSURE: 80 MMHG | HEIGHT: 67 IN | SYSTOLIC BLOOD PRESSURE: 110 MMHG | WEIGHT: 192 LBS | HEART RATE: 88 BPM | BODY MASS INDEX: 30.13 KG/M2

## 2025-07-30 DIAGNOSIS — F41.9 ANXIETY DISORDER, UNSPECIFIED: ICD-10-CM

## 2025-07-30 DIAGNOSIS — K21.9 GASTRO-ESOPHAGEAL REFLUX DISEASE W/OUT ESOPHAGITIS: ICD-10-CM

## 2025-07-30 DIAGNOSIS — G40.109 LOCALIZATION-RELATED (FOCAL) (PARTIAL) SYMPTOMATIC EPILEPSY AND EPILEPTIC SYNDROMES WITH SIMPLE PARTIAL SEIZURES, NOT INTRACTABLE, W/OUT STATUS EPILEPTICUS: ICD-10-CM

## 2025-07-30 PROCEDURE — 99205 OFFICE O/P NEW HI 60 MIN: CPT

## 2025-07-30 RX ORDER — PANTOPRAZOLE 40 MG/1
40 TABLET, DELAYED RELEASE ORAL DAILY
Qty: 30 | Refills: 0 | Status: ACTIVE | COMMUNITY
Start: 2025-07-30

## 2025-07-30 RX ORDER — CITALOPRAM HYDROBROMIDE 20 MG/1
20 TABLET, FILM COATED ORAL DAILY
Qty: 90 | Refills: 3 | Status: ACTIVE | COMMUNITY
Start: 2025-07-30

## 2025-07-30 RX ORDER — FAMOTIDINE 20 MG/1
20 TABLET, FILM COATED ORAL
Qty: 30 | Refills: 0 | Status: ACTIVE | COMMUNITY
Start: 2025-07-30

## 2025-07-30 RX ORDER — LACOSAMIDE 200 MG/1
200 TABLET ORAL
Qty: 60 | Refills: 5 | Status: ACTIVE | COMMUNITY
Start: 2025-07-30

## 2025-07-30 RX ORDER — ALPRAZOLAM 2 MG/1
2 TABLET ORAL 3 TIMES DAILY
Qty: 90 | Refills: 0 | Status: ACTIVE | COMMUNITY
Start: 2025-07-30

## 2025-07-30 RX ORDER — LEVETIRACETAM 750 MG/1
750 TABLET, FILM COATED ORAL
Qty: 360 | Refills: 1 | Status: ACTIVE | COMMUNITY
Start: 2025-07-30 | End: 1900-01-01

## 2025-09-08 ENCOUNTER — APPOINTMENT (OUTPATIENT)
Dept: NEUROLOGY | Facility: CLINIC | Age: 50
End: 2025-09-08
Payer: MEDICARE

## 2025-09-08 VITALS
BODY MASS INDEX: 30.13 KG/M2 | HEART RATE: 102 BPM | HEIGHT: 67 IN | SYSTOLIC BLOOD PRESSURE: 111 MMHG | WEIGHT: 192 LBS | DIASTOLIC BLOOD PRESSURE: 78 MMHG

## 2025-09-08 PROCEDURE — G2211 COMPLEX E/M VISIT ADD ON: CPT

## 2025-09-08 PROCEDURE — 99215 OFFICE O/P EST HI 40 MIN: CPT

## 2025-09-08 RX ORDER — MIDAZOLAM 5 MG/.1ML
5 SPRAY NASAL
Qty: 1 | Refills: 3 | Status: ACTIVE | COMMUNITY
Start: 2025-09-08 | End: 1900-01-01

## 2025-09-08 RX ORDER — MIRTAZAPINE 15 MG/1
15 TABLET, FILM COATED ORAL
Qty: 90 | Refills: 3 | Status: ACTIVE | COMMUNITY
Start: 2025-09-08

## 2025-09-08 RX ORDER — DIAZEPAM 5 MG/1
5 TABLET ORAL
Qty: 2 | Refills: 0 | Status: ACTIVE | COMMUNITY
Start: 2025-09-08 | End: 1900-01-01

## 2025-09-10 RX ORDER — DIAZEPAM 5 MG/100UL
5 SPRAY NASAL
Qty: 1 | Refills: 0 | Status: ACTIVE | COMMUNITY
Start: 2025-09-10 | End: 1900-01-01